# Patient Record
Sex: FEMALE | Race: OTHER | HISPANIC OR LATINO | Employment: FULL TIME | URBAN - METROPOLITAN AREA
[De-identification: names, ages, dates, MRNs, and addresses within clinical notes are randomized per-mention and may not be internally consistent; named-entity substitution may affect disease eponyms.]

---

## 2017-01-05 ENCOUNTER — ALLSCRIPTS OFFICE VISIT (OUTPATIENT)
Dept: OTHER | Facility: OTHER | Age: 47
End: 2017-01-05

## 2017-02-23 ENCOUNTER — ALLSCRIPTS OFFICE VISIT (OUTPATIENT)
Dept: OTHER | Facility: OTHER | Age: 47
End: 2017-02-23

## 2017-03-01 DIAGNOSIS — H81.10 BENIGN PAROXYSMAL VERTIGO: ICD-10-CM

## 2017-03-01 DIAGNOSIS — I10 ESSENTIAL (PRIMARY) HYPERTENSION: ICD-10-CM

## 2017-03-01 DIAGNOSIS — Z12.31 ENCOUNTER FOR SCREENING MAMMOGRAM FOR MALIGNANT NEOPLASM OF BREAST: ICD-10-CM

## 2017-03-01 DIAGNOSIS — D50.9 IRON DEFICIENCY ANEMIA: ICD-10-CM

## 2017-03-30 ENCOUNTER — ALLSCRIPTS OFFICE VISIT (OUTPATIENT)
Dept: OTHER | Facility: OTHER | Age: 47
End: 2017-03-30

## 2017-04-14 ENCOUNTER — HOSPITAL ENCOUNTER (OUTPATIENT)
Dept: RADIOLOGY | Age: 47
Discharge: HOME | End: 2017-04-14
Payer: COMMERCIAL

## 2017-04-14 DIAGNOSIS — Z12.31 ENCOUNTER FOR SCREENING MAMMOGRAM FOR MALIGNANT NEOPLASM OF BREAST: ICD-10-CM

## 2017-04-14 PROCEDURE — G0202 SCR MAMMO BI INCL CAD: HCPCS

## 2017-07-05 DIAGNOSIS — I10 ESSENTIAL (PRIMARY) HYPERTENSION: ICD-10-CM

## 2017-07-05 DIAGNOSIS — D50.9 IRON DEFICIENCY ANEMIA: ICD-10-CM

## 2017-07-20 ENCOUNTER — ALLSCRIPTS OFFICE VISIT (OUTPATIENT)
Dept: OTHER | Facility: OTHER | Age: 47
End: 2017-07-20

## 2017-09-25 DIAGNOSIS — D50.9 IRON DEFICIENCY ANEMIA: ICD-10-CM

## 2017-09-25 DIAGNOSIS — I10 ESSENTIAL (PRIMARY) HYPERTENSION: ICD-10-CM

## 2018-01-11 ENCOUNTER — ALLSCRIPTS OFFICE VISIT (OUTPATIENT)
Dept: OTHER | Facility: OTHER | Age: 48
End: 2018-01-11

## 2018-01-11 DIAGNOSIS — Z12.31 ENCOUNTER FOR SCREENING MAMMOGRAM FOR MALIGNANT NEOPLASM OF BREAST: ICD-10-CM

## 2018-01-13 VITALS
SYSTOLIC BLOOD PRESSURE: 124 MMHG | TEMPERATURE: 98.4 F | HEART RATE: 85 BPM | DIASTOLIC BLOOD PRESSURE: 84 MMHG | HEIGHT: 63 IN | BODY MASS INDEX: 33.04 KG/M2 | OXYGEN SATURATION: 99 % | WEIGHT: 186.5 LBS

## 2018-01-13 VITALS
HEIGHT: 63 IN | HEART RATE: 89 BPM | DIASTOLIC BLOOD PRESSURE: 86 MMHG | SYSTOLIC BLOOD PRESSURE: 142 MMHG | WEIGHT: 184.5 LBS | OXYGEN SATURATION: 99 % | TEMPERATURE: 99.1 F | BODY MASS INDEX: 32.69 KG/M2

## 2018-01-13 NOTE — PROGRESS NOTES
Assessment   1  Influenza vaccine needed (V04 81) (Z23)   2  Benign essential hypertension (401 1) (I10)   3  Lateral epicondylitis of right elbow (726 32) (M77 11)   4  Back pain, lumbosacral (724 2,724 6) (M54 5)   5  Leg paresthesia (782 0) (R20 2)   6  Iron deficiency anemia (280 9) (D50 9)    Plan   Back pain, lumbosacral, Lateral epicondylitis of right elbow    · Cyclobenzaprine HCl - 5 MG Oral Tablet; Take 1 tablet twice daily   · Nabumetone 500 MG Oral Tablet; Take 1 tablet twice daily  Benign essential hypertension, Iron deficiency anemia    · (1) COMPREHENSIVE METABOLIC PANEL; Status:Active; Requested for:02Apr2018;   Breast cancer screening    · * MAMMO SCREENING BILATERAL W CAD; Status:Hold For - Scheduling,Retrospective By Protocol    Authorization; Requested URD:[id number]; Influenza vaccine needed    · Stop: Fluzone Quadrivalent 0 5 ML Intramuscular Suspension Prefilled Syringe  Iron deficiency anemia    · (1) CBC/ PLT (NO DIFF); Status:Active; Requested for:02Apr2018; Discussion/Summary   Discussion Summary:    Patient will be provided with a forearm strap for lateral epicondylitis  We will prescribe Relafen 500 mg b i d  along with cyclobenzaprine 5 mg b i d  for her back  We discussed some back exercises as well as some exercises for her forearm muscles  Patient will contact the office if she is not improved within 3 to 4 weeks  Otherwise a follow-up is scheduled for 4 months      Chief Complaint   Chief Complaint Free Text Note Form: 6 months F/U for HTN, Iron deficiency, Sleep disorder, Obesity work 11/25/17    Chief Complaint Chronic Condition [facility]: Patient is here today for follow up of chronic conditions described in HPI  History of Present Illness   HPI: Patient presents to the office with complaints of right lateral elbow pain, bilateral lower back pain radiating into both buttocks and numbness in her right heel  Symptoms have been present now for approximately 3 weeks  Review of Systems   Complete-Female:      Constitutional: No fever, no chills, feels well, no tiredness, no recent weight gain or weight loss  Eyes: No complaints of eye pain, no red eyes, no eyesight problems, no discharge, no dry eyes, no itching of eyes  ENT: no complaints of earache, no loss of hearing, no nose bleeds, no nasal discharge, no sore throat, no hoarseness  Cardiovascular: No complaints of slow heart rate, no fast heart rate, no chest pain, no palpitations, no leg claudication, no lower extremity edema  Respiratory: No complaints of shortness of breath, no wheezing, no cough, no SOB on exertion, no orthopnea, no PND  Gastrointestinal: No complaints of abdominal pain, no constipation, no nausea or vomiting, no diarrhea, no bloody stools  Genitourinary: No complaints of dysuria, no incontinence, no pelvic pain, no dysmenorrhea, no vaginal discharge or bleeding  Musculoskeletal: as noted in HPI  Integumentary: No complaints of skin rash or lesions, no itching, no skin wounds, no breast pain or lump  Neurological: No complaints of headache, no confusion, no convulsions, no numbness, no dizziness or fainting, no tingling, no limb weakness, no difficulty walking  Psychiatric: Not suicidal, no sleep disturbance, no anxiety or depression, no change in personality, no emotional problems  Endocrine: No complaints of proptosis, no hot flashes, no muscle weakness, no deepening of the voice, no feelings of weakness  Hematologic/Lymphatic: No complaints of swollen glands, no swollen glands in the neck, does not bleed easily, does not bruise easily  Active Problems   1  Advance directive declined by patient (V49 89) (Z78 9)   2  Benign essential hypertension (401 1) (I10)   3  Bilateral carpal tunnel syndrome (354 0) (G56 03)   4   Class 1 obesity with alveolar hypoventilation without serious comorbidity with body mass index     (BMI) of 32 0 to 32 9 in adult (278 03,V85 32) (R61 3,W81 13)   5  Iron deficiency anemia (280 9) (D50 9)   6  Sleep disorder (780 50) (G47 9)    Past Medical History   1  History of Atypical chest pain (786 59) (R07 89)   2  History of BPPV (benign paroxysmal positional vertigo) (386 11) (H81 10)   3  History of abdominal pain (V13 89) (Z87 898)   4  History of acute pancreatitis (V12 79) (Z87 19)   5  History of anemia (V12 3) (Z86 2)   6  History of atopic dermatitis (V13 3) (Z87 2)   7  History of chest pain (V13 89) (Z87 898)   8  History of diarrhea (V12 79) (Z87 898)   9  History of gastroenteritis (V12 79) (Z87 19)   10  History of hypertension (V12 59) (Z86 79)   11  History of iron deficiency anemia (V12 3) (Z86 2)   12  History of nausea (V12 79) (Z87 898)   13  History of Localized hives (708 9) (L50 9)   14  History of Right shoulder pain, unspecified chronicity (719 41) (M25 511)  Active Problems And Past Medical History Reviewed: The active problems and past medical history were reviewed and updated today  Surgical History   1  History of Foot Surgery    Family History   Mother    1  Family history of diabetes mellitus (V18 0) (Z83 3)  Grandmother    2  Family history of malignant neoplasm (V16 9) (Z80 9)  Grandfather    3  Family history of diabetes mellitus (V18 0) (Z83 3)    Social History    · Advance directive declined by patient (V49 89) (Z78 9)   · Never a smoker   · No alcohol use   · No illicit drug use  Social History Reviewed: The social history was reviewed and is unchanged  Current Meds    1  Amlodipine Besylate-Valsartan 5-320 MG Oral Tablet; TAKE 1 TABLET DAILY; Therapy: 54PSP3284 to (Dragan Dinh)  Requested for: 91VOM9254; Last Rx:05Jan2017     Ordered   2  Omeprazole 20 MG Oral Capsule Delayed Release; TAKE 1 CAPSULE Daily PRN; Therapy: (Recorded:02Apr2015) to Recorded  Medication List Reviewed: The medication list was reviewed and updated today  Allergies   1  Amoxicillin CAPS   2  Penicillins   3  Medrol ([place]) TABS   4  Prevpac MISC    Vitals   Vital Signs    Recorded: 13VTK6955 03:56PM   Temperature 98 4 F, Oral   Heart Rate 72   Systolic 819, LUE, Sitting   Diastolic 86, LUE, Sitting   Height 5 ft 3 in   Weight 195 lb    BMI Calculated 34 54   BSA Calculated 1 91   O2 Saturation 98, RA     Physical Exam        Constitutional      General appearance: No acute distress, well appearing and well nourished  Eyes      Conjunctiva and lids: No swelling, erythema or discharge  Pupils and irises: Equal, round and reactive to light  Ears, Nose, Mouth, and Throat      External inspection of ears and nose: Normal        Pulmonary      Respiratory effort: No increased work of breathing or signs of respiratory distress  Auscultation of lungs: Clear to auscultation  Cardiovascular      Palpation of heart: Normal PMI, no thrills  Auscultation of heart: Normal rate and rhythm, normal S1 and S2, without murmurs  Examination of extremities for edema and/or varicosities: Normal        Abdomen      Abdomen: Non-tender, no masses  Lymphatic      Palpation of lymph nodes in neck: No lymphadenopathy  Musculoskeletal      Gait and station: Normal        Digits and nails: Normal without clubbing or cyanosis  -- Tenderness over the right lateral epicondyle  No palpable abnormalities in the right heel  Neurologic      Cranial nerves: Cranial nerves 2-12 intact  Reflexes: 2+ and symmetric  -- No pain on straight leg raising bilaterally to 45Â°  Sensation: No sensory loss  Psychiatric      Orientation to person, place, and time: Normal        Mood and affect: Normal           Health Management   History of Cervical cancer screening   (1) THIN PREP PAP WITH IMAGING; every 5 years; Next Due: 94CQU1013; Overdue  History of Depression screening   PHevery-2 Adult Depression Screening; every 1 year;  Last 16Bum6222; Next Due: 45NOI3201; Active  History of Visit for screening mammogram   Digital Bilateral Screening Mammogram With CAD; every 1 year; Last 06Apr2015; Next Due:    08TDW4874; Overdue  DIGTL SCRN MAMMO W/CAD; every 1 year; Last 06Apr2015; Next Due: 72IVJ3751;  Overdue    Signatures    Electronically signed by : Tam Cordero MD; Jan 11 2018  5:41PM EST                       (Author)

## 2018-01-14 VITALS
OXYGEN SATURATION: 98 % | DIASTOLIC BLOOD PRESSURE: 92 MMHG | SYSTOLIC BLOOD PRESSURE: 154 MMHG | TEMPERATURE: 98.5 F | BODY MASS INDEX: 32.96 KG/M2 | HEIGHT: 63 IN | HEART RATE: 78 BPM | WEIGHT: 186 LBS

## 2018-01-14 VITALS
HEART RATE: 85 BPM | HEIGHT: 63 IN | BODY MASS INDEX: 33.84 KG/M2 | TEMPERATURE: 99.2 F | WEIGHT: 191 LBS | SYSTOLIC BLOOD PRESSURE: 126 MMHG | OXYGEN SATURATION: 98 % | DIASTOLIC BLOOD PRESSURE: 86 MMHG

## 2018-01-23 VITALS
OXYGEN SATURATION: 98 % | TEMPERATURE: 98.4 F | HEIGHT: 63 IN | BODY MASS INDEX: 34.55 KG/M2 | SYSTOLIC BLOOD PRESSURE: 138 MMHG | HEART RATE: 72 BPM | WEIGHT: 195 LBS | DIASTOLIC BLOOD PRESSURE: 86 MMHG

## 2018-04-02 DIAGNOSIS — I10 ESSENTIAL (PRIMARY) HYPERTENSION: ICD-10-CM

## 2018-04-02 DIAGNOSIS — D50.9 IRON DEFICIENCY ANEMIA: ICD-10-CM

## 2018-04-20 ENCOUNTER — HOSPITAL ENCOUNTER (OUTPATIENT)
Dept: RADIOLOGY | Age: 48
Discharge: HOME | End: 2018-04-20
Payer: COMMERCIAL

## 2018-04-20 DIAGNOSIS — Z12.31 ENCOUNTER FOR SCREENING MAMMOGRAM FOR MALIGNANT NEOPLASM OF BREAST: ICD-10-CM

## 2018-04-20 PROCEDURE — 77067 SCR MAMMO BI INCL CAD: CPT

## 2018-04-20 PROCEDURE — 77063 BREAST TOMOSYNTHESIS BI: CPT

## 2018-04-25 RX ORDER — OMEPRAZOLE 20 MG/1
1 CAPSULE, DELAYED RELEASE ORAL DAILY PRN
COMMUNITY
End: 2018-04-26 | Stop reason: ALTCHOICE

## 2018-04-25 RX ORDER — AMLODIPINE AND VALSARTAN 5; 320 MG/1; MG/1
1 TABLET ORAL DAILY
COMMUNITY
Start: 2016-10-19 | End: 2019-04-18 | Stop reason: ALTCHOICE

## 2018-04-25 RX ORDER — NABUMETONE 500 MG/1
1 TABLET, FILM COATED ORAL 2 TIMES DAILY
COMMUNITY
Start: 2018-01-11 | End: 2019-10-24 | Stop reason: ALTCHOICE

## 2018-04-25 RX ORDER — MOMETASONE FUROATE 1 MG/G
OINTMENT TOPICAL DAILY
COMMUNITY
Start: 2015-08-06 | End: 2019-10-24 | Stop reason: SDUPTHER

## 2018-04-25 RX ORDER — CYCLOBENZAPRINE HCL 5 MG
1 TABLET ORAL 2 TIMES DAILY
COMMUNITY
Start: 2018-01-11 | End: 2019-10-24 | Stop reason: ALTCHOICE

## 2018-04-26 ENCOUNTER — OFFICE VISIT (OUTPATIENT)
Dept: INTERNAL MEDICINE CLINIC | Facility: CLINIC | Age: 48
End: 2018-04-26
Payer: COMMERCIAL

## 2018-04-26 VITALS
SYSTOLIC BLOOD PRESSURE: 138 MMHG | HEART RATE: 87 BPM | TEMPERATURE: 98.6 F | WEIGHT: 196.4 LBS | BODY MASS INDEX: 34.8 KG/M2 | DIASTOLIC BLOOD PRESSURE: 90 MMHG | HEIGHT: 63 IN | OXYGEN SATURATION: 98 %

## 2018-04-26 DIAGNOSIS — D50.9 IRON DEFICIENCY ANEMIA, UNSPECIFIED IRON DEFICIENCY ANEMIA TYPE: ICD-10-CM

## 2018-04-26 DIAGNOSIS — I10 BENIGN ESSENTIAL HYPERTENSION: Primary | ICD-10-CM

## 2018-04-26 DIAGNOSIS — E66.2 CLASS 1 OBESITY WITH ALVEOLAR HYPOVENTILATION WITHOUT SERIOUS COMORBIDITY WITH BODY MASS INDEX (BMI) OF 32.0 TO 32.9 IN ADULT (HCC): ICD-10-CM

## 2018-04-26 DIAGNOSIS — R79.89 ELEVATED LIVER FUNCTION TESTS: ICD-10-CM

## 2018-04-26 DIAGNOSIS — B35.1 ONYCHOMYCOSIS OF LEFT GREAT TOE: ICD-10-CM

## 2018-04-26 PROBLEM — G47.9 SLEEP DISORDER: Status: ACTIVE | Noted: 2017-07-20

## 2018-04-26 PROBLEM — M77.11 LATERAL EPICONDYLITIS OF RIGHT ELBOW: Status: ACTIVE | Noted: 2018-01-11

## 2018-04-26 PROBLEM — M54.50 BACK PAIN, LUMBOSACRAL: Status: ACTIVE | Noted: 2018-01-11

## 2018-04-26 PROBLEM — E66.811 CLASS 1 OBESITY WITH ALVEOLAR HYPOVENTILATION WITHOUT SERIOUS COMORBIDITY WITH BODY MASS INDEX (BMI) OF 32.0 TO 32.9 IN ADULT (HCC): Status: ACTIVE | Noted: 2017-07-20

## 2018-04-26 PROBLEM — R20.2 LEG PARESTHESIA: Status: ACTIVE | Noted: 2018-01-11

## 2018-04-26 PROCEDURE — 1036F TOBACCO NON-USER: CPT | Performed by: INTERNAL MEDICINE

## 2018-04-26 PROCEDURE — 99214 OFFICE O/P EST MOD 30 MIN: CPT | Performed by: INTERNAL MEDICINE

## 2018-04-26 RX ORDER — FERROUS SULFATE TAB EC 324 MG (65 MG FE EQUIVALENT) 324 (65 FE) MG
324 TABLET DELAYED RESPONSE ORAL
Qty: 50 TABLET | Refills: 0 | Status: SHIPPED | OUTPATIENT
Start: 2018-04-26 | End: 2021-09-21

## 2018-04-26 RX ORDER — KETOCONAZOLE 20 MG/G
CREAM TOPICAL DAILY
Qty: 30 G | Refills: 1 | Status: SHIPPED | OUTPATIENT
Start: 2018-04-26 | End: 2019-10-24 | Stop reason: ALTCHOICE

## 2018-04-26 RX ORDER — INDAPAMIDE 2.5 MG/1
2.5 TABLET, FILM COATED ORAL EVERY MORNING
Qty: 90 TABLET | Refills: 1 | Status: SHIPPED | OUTPATIENT
Start: 2018-04-26 | End: 2021-09-21 | Stop reason: SDUPTHER

## 2018-04-26 NOTE — ASSESSMENT & PLAN NOTE
Labs were indicative of a recurrent iron deficiency anemia  Patient does have a history of rather heavy menstrual periods  We will restart iron and re-evaluate

## 2018-04-26 NOTE — ASSESSMENT & PLAN NOTE
The patient has discontinued omeprazole on her own  No obvious etiology to the elevation liver function studies  We will recheck in 6 to 8 weeks and if liver enzymes remained elevated additional workup will be necessary

## 2018-04-26 NOTE — ASSESSMENT & PLAN NOTE
No major issues  Continued dietary management with calorie reduction is advised  Recent labs disclosed a mild elevation in transaminases  This could be representative of fatty liver  We will recheck in 6 to 8 weeks and if enzymes remain elevated we will initiate a diagnostic evaluation with ultrasound and serologies

## 2018-04-26 NOTE — PATIENT INSTRUCTIONS
We will restart the patient on iron supplements  We will recheck her labs in 6 weeks  Will follow up her liver enzymes  It if there is a persistence in her enzyme elevation we will consider diagnostic evaluation with ultrasound and serologies  She may also require evaluation and referral to Gastroenterology for endoscopic studies

## 2018-04-26 NOTE — PROGRESS NOTES
Assessment/Plan:    Benign essential hypertension  Blood pressure is mildly elevated from previous  On recheck it was 150/90  We will add a diuretic to her antihypertensive regimen and follow-up in 2 months    Onychomycosis of left great toe   We will prescribe ketoconazole cream twice daily for 30 to 60 days    Iron deficiency anemia   Labs were indicative of a recurrent iron deficiency anemia  Patient does have a history of rather heavy menstrual periods  We will restart iron and re-evaluate  Elevated liver function tests    The patient has discontinued omeprazole on her own  No obvious etiology to the elevation liver function studies  We will recheck in 6 to 8 weeks and if liver enzymes remained elevated additional workup will be necessary  Class 1 obesity with alveolar hypoventilation without serious comorbidity with body mass index (BMI) of 32 0 to 32 9 in [facility])   No major issues  Continued dietary management with calorie reduction is advised  Recent labs disclosed a mild elevation in transaminases  This could be representative of fatty liver  We will recheck in 6 to 8 weeks and if enzymes remain elevated we will initiate a diagnostic evaluation with ultrasound and serologies  Diagnoses and all orders for this visit:    Benign essential hypertension  -     indapamide (LOZOL) 2 5 mg tablet; Take 1 tablet (2 5 mg total) by mouth every morning for 180 days  -     CBC and differential; Future  -     Comprehensive metabolic panel; Future    Onychomycosis of left great toe  -     ketoconazole (NIZORAL) 2 % cream; Apply topically daily for 90 days  -     CBC and differential; Future  -     Comprehensive metabolic panel; Future    Iron deficiency anemia, unspecified iron deficiency anemia type  -     ferrous sulfate 324 (65 Fe) mg; Take 1 tablet (324 mg total) by mouth 2 (two) times a day before meals  -     Ferritin;  Future  -     CBC and differential; Future  -     Comprehensive metabolic panel; Future  -     Iron; Future  -     Iron Saturation %; Future    Class 1 obesity with alveolar hypoventilation without serious comorbidity with body mass index (BMI) of 32 0 to 32 9 in adult (HCC)  -     CBC and differential; Future  -     Comprehensive metabolic panel; Future    Elevated liver function tests  -     CBC and differential; Future  -     Comprehensive metabolic panel; Future    Other orders  -     amLODIPine-valsartan (EXFORGE) 5-320 MG per tablet; Take 1 tablet by mouth daily  -     cyclobenzaprine (FLEXERIL) 5 mg tablet; Take 1 tablet by mouth 2 (two) times a day  -     mometasone (ELOCON) 0 1 % ointment; Apply topically daily  -     nabumetone (RELAFEN) 500 mg tablet; Take 1 tablet by mouth 2 (two) times a day  -     Discontinue: omeprazole (PriLOSEC) 20 mg delayed release capsule; Take 1 capsule by mouth daily as needed          Subjective:      Patient ID: Emily Cui is a 52 y o  female  Patient presents to the office for follow-up visit for hypertension, history of iron deficiency anemia and complains only of a fungal infection of her left great toenail  She otherwise she offers no particular complaints  She had some recent lab work which disclosed a drop in her hemoglobin and a recurrence of some microcytosis probably consistent with a recurrent iron deficiency  She has never had a colonoscopy  She denies any changes in bowel habits she denies any melena or hematochezia or change in her bowel habits  There is no family history of malignancy in any 1st degree relatives  She denies any abdominal complaints  She has no nausea or vomiting  She denies any  abdominal  Discomfort          Family History   Problem Relation Age of Onset    Diabetes Mother      Diabetes Mellitus    Diabetes Other      Diabetes Mellitus    Cancer Other     No Known Problems Father      Social History     Social History    Marital status: /Civil Union     Spouse name: N/A    Number of children: N/A    Years of education: N/A     Occupational History    Not on file  Social History Main Topics    Smoking status: Never Smoker    Smokeless tobacco: Never Used    Alcohol use No    Drug use: No    Sexual activity: Not on file     Other Topics Concern    Not on file     Social History Narrative    Advance directive declined by patient     Past Medical History:   Diagnosis Date    Anemia     Atopic dermatitis 08/06/2015    Atypical chest pain 04/02/2015    BPPV (benign paroxysmal positional vertigo) 01/05/2017    Chest pain     Class 1 obesity with alveolar hypoventilation without serious comorbidity with body mass index (BMI) of 32 0 to 32 9 [facility] 7/20/2017    Gastroenteritis     Hypertension     Iron deficiency anemia 03/30/2017    Lateral epicondylitis of right elbow 1/11/2018       Current Outpatient Prescriptions:     amLODIPine-valsartan (EXFORGE) 5-320 MG per tablet, Take 1 tablet by mouth daily, Disp: , Rfl:     cyclobenzaprine (FLEXERIL) 5 mg tablet, Take 1 tablet by mouth 2 (two) times a day, Disp: , Rfl:     mometasone (ELOCON) 0 1 % ointment, Apply topically daily, Disp: , Rfl:     nabumetone (RELAFEN) 500 mg tablet, Take 1 tablet by mouth 2 (two) times a day, Disp: , Rfl:     ferrous sulfate 324 (65 Fe) mg, Take 1 tablet (324 mg total) by mouth 2 (two) times a day before meals, Disp: 50 tablet, Rfl: 0    indapamide (LOZOL) 2 5 mg tablet, Take 1 tablet (2 5 mg total) by mouth every morning for 180 days, Disp: 90 tablet, Rfl: 1    ketoconazole (NIZORAL) 2 % cream, Apply topically daily for 90 days, Disp: 30 g, Rfl: 1  Allergies   Allergen Reactions    Medrol  [Methylprednisolone]     Prevpac  [[id number]]     Amoxicillin Rash    Penicillins Rash     Past Surgical History:   Procedure Laterality Date    FOOT SURGERY           Review of Systems   Constitutional: Negative  HENT: Negative  Eyes: Negative  Respiratory: Negative  Cardiovascular: Negative  Gastrointestinal: Negative  Endocrine: Negative  Genitourinary: Negative  Musculoskeletal: Negative  Allergic/Immunologic: Negative  Neurological: Negative  Hematological: Negative  Psychiatric/Behavioral: Negative  Objective:      /90 (BP Location: Left arm, Patient Position: Sitting, Cuff Size: Standard)   Pulse 87   Temp 98 6 °F (37 °C) (Oral)   Ht 5' 3" (1 6 m)   Wt 89 1 kg (196 lb 6 4 oz)   SpO2 98%   BMI 34 79 kg/m²          Physical Exam   Constitutional: She is oriented to person, place, and time  She appears well-developed and well-nourished  HENT:   Head: Normocephalic and atraumatic  Eyes: Conjunctivae are normal  Pupils are equal, round, and reactive to light  No scleral icterus  Neck: Normal range of motion  No JVD present  No thyromegaly present  Cardiovascular: Normal rate, regular rhythm, normal heart sounds and intact distal pulses  No murmur heard  Pulmonary/Chest: Effort normal and breath sounds normal  No respiratory distress  Abdominal: Soft  She exhibits no distension and no mass  There is no tenderness  Musculoskeletal: Normal range of motion  She exhibits no edema or deformity  Lymphadenopathy:     She has no cervical adenopathy  Neurological: She is alert and oriented to person, place, and time  Skin:   Discoloration of the left great toenail consistent with onychomycosis   Psychiatric: She has a normal mood and affect   Thought content normal

## 2018-04-26 NOTE — ASSESSMENT & PLAN NOTE
Blood pressure is mildly elevated from previous  On recheck it was 150/90    We will add a diuretic to her antihypertensive regimen and follow-up in 2 months

## 2019-04-18 ENCOUNTER — OFFICE VISIT (OUTPATIENT)
Dept: INTERNAL MEDICINE CLINIC | Facility: CLINIC | Age: 49
End: 2019-04-18
Payer: COMMERCIAL

## 2019-04-18 VITALS
TEMPERATURE: 98.5 F | WEIGHT: 191.8 LBS | DIASTOLIC BLOOD PRESSURE: 70 MMHG | OXYGEN SATURATION: 98 % | HEART RATE: 86 BPM | SYSTOLIC BLOOD PRESSURE: 140 MMHG | HEIGHT: 63 IN | BODY MASS INDEX: 33.98 KG/M2

## 2019-04-18 DIAGNOSIS — Z12.31 ENCOUNTER FOR SCREENING MAMMOGRAM FOR MALIGNANT NEOPLASM OF BREAST: ICD-10-CM

## 2019-04-18 DIAGNOSIS — Z23 NEED FOR DIPHTHERIA-TETANUS-PERTUSSIS (TDAP) VACCINE: ICD-10-CM

## 2019-04-18 DIAGNOSIS — H53.9 VISION DISTURBANCE: ICD-10-CM

## 2019-04-18 DIAGNOSIS — Z23 NEED FOR PNEUMOCOCCAL VACCINE: ICD-10-CM

## 2019-04-18 DIAGNOSIS — I10 BENIGN ESSENTIAL HYPERTENSION: ICD-10-CM

## 2019-04-18 DIAGNOSIS — R79.89 ELEVATED LIVER FUNCTION TESTS: ICD-10-CM

## 2019-04-18 DIAGNOSIS — Z23 NEED FOR INFLUENZA VACCINATION: Primary | ICD-10-CM

## 2019-04-18 DIAGNOSIS — E66.2 CLASS 1 OBESITY WITH ALVEOLAR HYPOVENTILATION WITHOUT SERIOUS COMORBIDITY WITH BODY MASS INDEX (BMI) OF 32.0 TO 32.9 IN ADULT (HCC): ICD-10-CM

## 2019-04-18 DIAGNOSIS — D50.9 IRON DEFICIENCY ANEMIA, UNSPECIFIED IRON DEFICIENCY ANEMIA TYPE: ICD-10-CM

## 2019-04-18 PROCEDURE — 3008F BODY MASS INDEX DOCD: CPT | Performed by: INTERNAL MEDICINE

## 2019-04-18 PROCEDURE — 99214 OFFICE O/P EST MOD 30 MIN: CPT | Performed by: INTERNAL MEDICINE

## 2019-04-18 RX ORDER — VALSARTAN 320 MG/1
320 TABLET ORAL DAILY
Qty: 30 TABLET | Refills: 5 | Status: SHIPPED | OUTPATIENT
Start: 2019-04-27 | End: 2019-10-24 | Stop reason: SDUPTHER

## 2019-04-19 ENCOUNTER — HOSPITAL ENCOUNTER (OUTPATIENT)
Dept: RADIOLOGY | Age: 49
Discharge: HOME | End: 2019-04-19
Payer: COMMERCIAL

## 2019-04-19 VITALS — HEIGHT: 63 IN | BODY MASS INDEX: 33.84 KG/M2 | WEIGHT: 191 LBS

## 2019-04-19 DIAGNOSIS — Z12.31 ENCOUNTER FOR SCREENING MAMMOGRAM FOR MALIGNANT NEOPLASM OF BREAST: ICD-10-CM

## 2019-04-19 PROCEDURE — 77067 SCR MAMMO BI INCL CAD: CPT

## 2019-04-19 PROCEDURE — 77063 BREAST TOMOSYNTHESIS BI: CPT

## 2019-10-24 ENCOUNTER — OFFICE VISIT (OUTPATIENT)
Dept: INTERNAL MEDICINE CLINIC | Facility: CLINIC | Age: 49
End: 2019-10-24
Payer: COMMERCIAL

## 2019-10-24 VITALS
DIASTOLIC BLOOD PRESSURE: 80 MMHG | BODY MASS INDEX: 34.34 KG/M2 | TEMPERATURE: 98.3 F | SYSTOLIC BLOOD PRESSURE: 160 MMHG | HEIGHT: 62 IN | HEART RATE: 69 BPM | WEIGHT: 186.6 LBS | OXYGEN SATURATION: 98 %

## 2019-10-24 DIAGNOSIS — I10 BENIGN ESSENTIAL HYPERTENSION: ICD-10-CM

## 2019-10-24 DIAGNOSIS — Z23 NEED FOR INFLUENZA VACCINATION: Primary | ICD-10-CM

## 2019-10-24 DIAGNOSIS — L20.81 ATOPIC NEURODERMATITIS: ICD-10-CM

## 2019-10-24 DIAGNOSIS — H60.312 ACUTE DIFFUSE OTITIS EXTERNA OF LEFT EAR: ICD-10-CM

## 2019-10-24 DIAGNOSIS — D50.9 IRON DEFICIENCY ANEMIA, UNSPECIFIED IRON DEFICIENCY ANEMIA TYPE: ICD-10-CM

## 2019-10-24 PROCEDURE — 3008F BODY MASS INDEX DOCD: CPT | Performed by: INTERNAL MEDICINE

## 2019-10-24 PROCEDURE — 99214 OFFICE O/P EST MOD 30 MIN: CPT | Performed by: INTERNAL MEDICINE

## 2019-10-24 RX ORDER — MOMETASONE FUROATE 1 MG/G
OINTMENT TOPICAL DAILY
Qty: 45 G | Refills: 1 | Status: SHIPPED | OUTPATIENT
Start: 2019-10-24

## 2019-10-24 RX ORDER — SULFAMETHOXAZOLE AND TRIMETHOPRIM 800; 160 MG/1; MG/1
1 TABLET ORAL EVERY 12 HOURS SCHEDULED
Qty: 14 TABLET | Refills: 0 | Status: SHIPPED | OUTPATIENT
Start: 2019-10-24 | End: 2019-10-31

## 2019-10-24 RX ORDER — CIPROFLOXACIN 0.5 MG/.25ML
0.2 SOLUTION/ DROPS AURICULAR (OTIC) 2 TIMES DAILY
Qty: 1 VIAL | Refills: 0 | Status: SHIPPED | OUTPATIENT
Start: 2019-10-24 | End: 2019-10-31

## 2019-10-24 RX ORDER — VALSARTAN 320 MG/1
320 TABLET ORAL DAILY
Qty: 30 TABLET | Refills: 5 | Status: SHIPPED | OUTPATIENT
Start: 2019-10-24 | End: 2020-06-12 | Stop reason: ALTCHOICE

## 2019-10-24 NOTE — ASSESSMENT & PLAN NOTE
Will prescribe ciprofloxacin otic solution twice daily along with Bactrim double strength 1 tablet twice daily

## 2019-10-24 NOTE — PROGRESS NOTES
Assessment/Plan:    Atopic neurodermatitis  Will prescribe Elocon 0 1% to be applied twice daily  Acute diffuse otitis externa of left ear  Will prescribe ciprofloxacin otic solution twice daily along with Bactrim double strength 1 tablet twice daily  Benign essential hypertension  Restart valsartan 320 mg once daily    Iron deficiency anemia  Follow-up CBC with differential requested       Diagnoses and all orders for this visit:    Need for influenza vaccination  -     influenza vaccine, [id number], quadrivalent, 0 5 mL, preservative-free, for adult and pediatric patients 6 mos+ (AFLURIA, FLUARIX, FLULAVAL, FLUZONE)    Benign essential hypertension  -     valsartan (DIOVAN) 320 MG tablet; Take 1 tablet (320 mg total) by mouth daily    Acute diffuse otitis externa of left ear  -     Ciprofloxacin HCl 0 2 % otic solution; Administer 0 2 mL into the left ear 2 (two) times a day for 7 days  -     sulfamethoxazole-trimethoprim (BACTRIM DS) 800-160 mg per tablet; Take 1 tablet by mouth every 12 (twelve) hours for 7 days    Atopic neurodermatitis  -     mometasone (ELOCON) 0 1 % ointment; Apply topically daily    Iron deficiency anemia, unspecified iron deficiency anemia type  -     CBC and Platelet; Future  -     Basic metabolic panel; Future          Subjective:      Patient ID: Cheyenne Panchal is a 52 y o  female  Patient presents to the office for follow-up visit and is complaining of some pain in her left ear  She denies any fevers or chills  She denies sore throat  She is due for some follow-up lab work  She discontinued taking her valsartan when she saw there was a recall but did not contact the office for alternative medication  Consequently, her blood pressure is noted to be mildly elevated  She also complains of a rash on the extensor surfaces of both elbows        Family History   Problem Relation Age of Onset    Diabetes Mother         Diabetes Mellitus    Diabetes Other         Diabetes Mellitus    Cancer Other     No Known Problems Father      Social History     Socioeconomic History    Marital status: /Civil Union     Spouse name: Not on file    Number of children: Not on file    Years of education: Not on file    Highest education level: Not on file   Occupational History    Not on file   Social Needs    Financial resource strain: Not on file    Food insecurity:     Worry: Not on file     Inability: Not on file    Transportation needs:     Medical: Not on file     Non-medical: Not on file   Tobacco Use    Smoking status: Never Smoker    Smokeless tobacco: Never Used   Substance and Sexual Activity    Alcohol use: No    Drug use: No    Sexual activity: Not on file   Lifestyle    Physical activity:     Days per week: Not on file     Minutes per session: Not on file    Stress: Not on file   Relationships    Social connections:     Talks on phone: Not on file     Gets together: Not on file     Attends Mandaen service: Not on file     Active member of club or organization: Not on file     Attends meetings of clubs or organizations: Not on file     Relationship status: Not on file    Intimate partner violence:     Fear of current or ex partner: Not on file     Emotionally abused: Not on file     Physically abused: Not on file     Forced sexual activity: Not on file   Other Topics Concern    Not on file   Social History Narrative    Advance directive declined by patient     Past Medical History:   Diagnosis Date    Anemia     Atopic dermatitis 08/06/2015    Atypical chest pain 04/02/2015    BPPV (benign paroxysmal positional vertigo) 01/05/2017    Chest pain     Class 1 obesity with alveolar hypoventilation without serious comorbidity with body mass index (BMI) of 32 0 to 32 9 in adult ([facility] ) 7/20/2017    Gastroenteritis     Hypertension     Iron deficiency anemia 03/30/2017    Lateral epicondylitis of right elbow 1/11/2018       Current Outpatient Medications:    ferrous sulfate 324 (65 Fe) mg, Take 1 tablet (324 mg total) by mouth 2 (two) times a day before meals, Disp: 50 tablet, Rfl: 0    mometasone (ELOCON) 0 1 % ointment, Apply topically daily, Disp: 45 g, Rfl: 1    Ciprofloxacin HCl 0 2 % otic solution, Administer 0 2 mL into the left ear 2 (two) times a day for 7 days, Disp: 1 vial, Rfl: 0    indapamide (LOZOL) 2 5 mg tablet, Take 1 tablet (2 5 mg total) by mouth every morning for 180 days (Patient not taking: Reported on 10/24/2019), Disp: 90 tablet, Rfl: 1    sulfamethoxazole-trimethoprim (BACTRIM DS) 800-160 mg per tablet, Take 1 tablet by mouth every 12 (twelve) hours for 7 days, Disp: 14 tablet, Rfl: 0    valsartan (DIOVAN) 320 MG tablet, Take 1 tablet (320 mg total) by mouth daily, Disp: 30 tablet, Rfl: 5  Allergies   Allergen Reactions    Medrol  [Methylprednisolone]     Prevpac  [[id number]]     Amoxicillin Rash    Penicillins Rash     Past Surgical History:   Procedure Laterality Date    FOOT SURGERY           Review of Systems   Constitutional: Negative  HENT: Positive for ear pain (And mild swelling)  Eyes: Negative  Respiratory: Negative  Cardiovascular: Negative  Gastrointestinal: Negative  Genitourinary: Negative  Musculoskeletal: Negative  Skin: Negative  Neurological: Negative  Psychiatric/Behavioral: Negative  All other systems reviewed and are negative  Objective:      BP (!) 174/90 (BP Location: Left arm, Patient Position: Sitting, Cuff Size: Standard)   Pulse 69   Temp 98 3 °F (36 8 °C) (Oral)   Ht 5' 2 21" (1 58 m)   Wt 84 6 kg (186 lb 9 6 oz)   SpO2 98%   BMI 33 91 kg/m²          Physical Exam   Constitutional: She is oriented to person, place, and time  She appears well-developed and well-nourished  No distress  HENT:   Head: Normocephalic and atraumatic     Right Ear: Tympanic membrane, external ear and ear canal normal    Left Ear: Tympanic membrane and ear canal normal  There is swelling (Mild swelling of the left auricle) and tenderness (Mild tenderness of the externally)  No mastoid tenderness  Tympanic membrane is not injected, not perforated, not retracted and not bulging  No hemotympanum  Mouth/Throat: Oropharynx is clear and moist    Eyes: Pupils are equal, round, and reactive to light  Conjunctivae are normal  No scleral icterus  Neck: Neck supple  No JVD present  No thyromegaly present  Cardiovascular: Normal rate and regular rhythm  Pulmonary/Chest: Effort normal  No respiratory distress  Abdominal: Soft  Bowel sounds are normal  She exhibits no distension  Musculoskeletal: She exhibits no edema or deformity  Lymphadenopathy:     She has no cervical adenopathy  Neurological: She is alert and oriented to person, place, and time  No cranial nerve deficit  No focal motor deficits  Skin: Skin is warm and dry  Rash (Atopic appearing rash 3 cm distal to the olecranon bilaterally measuring approximately 3 cm diameter) noted  She is not diaphoretic  No erythema  Psychiatric: She has a normal mood and affect  Her behavior is normal    Vitals reviewed

## 2020-06-12 ENCOUNTER — OFFICE VISIT (OUTPATIENT)
Dept: INTERNAL MEDICINE CLINIC | Facility: CLINIC | Age: 50
End: 2020-06-12
Payer: COMMERCIAL

## 2020-06-12 VITALS
HEIGHT: 63 IN | HEART RATE: 72 BPM | TEMPERATURE: 98.1 F | SYSTOLIC BLOOD PRESSURE: 164 MMHG | WEIGHT: 185.8 LBS | DIASTOLIC BLOOD PRESSURE: 80 MMHG | OXYGEN SATURATION: 98 % | BODY MASS INDEX: 32.92 KG/M2

## 2020-06-12 DIAGNOSIS — Z12.31 ENCOUNTER FOR SCREENING MAMMOGRAM FOR MALIGNANT NEOPLASM OF BREAST: Primary | ICD-10-CM

## 2020-06-12 DIAGNOSIS — I10 BENIGN ESSENTIAL HYPERTENSION: ICD-10-CM

## 2020-06-12 DIAGNOSIS — D50.9 IRON DEFICIENCY ANEMIA, UNSPECIFIED IRON DEFICIENCY ANEMIA TYPE: ICD-10-CM

## 2020-06-12 DIAGNOSIS — M70.50 PES ANSERINE BURSITIS: ICD-10-CM

## 2020-06-12 DIAGNOSIS — R79.89 ELEVATED LIVER FUNCTION TESTS: ICD-10-CM

## 2020-06-12 PROCEDURE — 3008F BODY MASS INDEX DOCD: CPT | Performed by: INTERNAL MEDICINE

## 2020-06-12 PROCEDURE — 3077F SYST BP >= 140 MM HG: CPT | Performed by: INTERNAL MEDICINE

## 2020-06-12 PROCEDURE — 3079F DIAST BP 80-89 MM HG: CPT | Performed by: INTERNAL MEDICINE

## 2020-06-12 PROCEDURE — 1036F TOBACCO NON-USER: CPT | Performed by: INTERNAL MEDICINE

## 2020-06-12 PROCEDURE — 99214 OFFICE O/P EST MOD 30 MIN: CPT | Performed by: INTERNAL MEDICINE

## 2020-06-12 RX ORDER — AMLODIPINE AND VALSARTAN 5; 320 MG/1; MG/1
1 TABLET ORAL DAILY
Qty: 30 TABLET | Refills: 5 | Status: SHIPPED | OUTPATIENT
Start: 2020-06-12 | End: 2021-05-19

## 2020-07-13 ENCOUNTER — TELEPHONE (OUTPATIENT)
Dept: INTERNAL MEDICINE CLINIC | Facility: CLINIC | Age: 50
End: 2020-07-13

## 2020-07-13 DIAGNOSIS — Z12.11 COLON CANCER SCREENING: Primary | ICD-10-CM

## 2020-07-13 NOTE — TELEPHONE ENCOUNTER
cologuard ordered  Please let patient know  Also, please tell patient when she receives the kit to call the number and verify test is 100% covered  Then please return kit within 1 week of receiving kit  Thanks so much

## 2020-07-13 NOTE — TELEPHONE ENCOUNTER
Left message on machine for patient to call me at [facility] office  Pt speaks [place]  Pt due for CRC screen  Please find out if patient is willing to do cologuard or colonoscopy and let me know

## 2020-10-09 ENCOUNTER — HOSPITAL ENCOUNTER (OUTPATIENT)
Dept: RADIOLOGY | Age: 50
Discharge: HOME | End: 2020-10-09
Payer: COMMERCIAL

## 2020-10-09 VITALS — BODY MASS INDEX: 33.66 KG/M2 | WEIGHT: 190 LBS | HEIGHT: 63 IN

## 2020-10-09 DIAGNOSIS — Z12.31 ENCOUNTER FOR SCREENING MAMMOGRAM FOR MALIGNANT NEOPLASM OF BREAST: ICD-10-CM

## 2020-10-09 PROCEDURE — 77067 SCR MAMMO BI INCL CAD: CPT

## 2020-10-09 PROCEDURE — 77063 BREAST TOMOSYNTHESIS BI: CPT

## 2020-11-12 NOTE — TELEPHONE ENCOUNTER
lmom letting the pt know that she would need to send in the cologuard kit with in the next week and if she has any question please call us back

## 2020-11-12 NOTE — TELEPHONE ENCOUNTER
Pt speaks [place]  We will do not have Cologuard results  Please call pt and have her return kit within a week per Dr Luther Trimble  Pt has pending appt with Dr Luther Trimble on 12/17/2020  Results can be reviewed at appt  Thank you

## 2020-12-03 ENCOUNTER — VBI (OUTPATIENT)
Dept: ADMINISTRATIVE | Facility: OTHER | Age: 50
End: 2020-12-03

## 2021-02-22 NOTE — TELEPHONE ENCOUNTER
Can someone please call pt and inform her Dr Marcella Astudillo would like her to return the Cologuard kit within next week? Thank you

## 2021-04-06 ENCOUNTER — VBI (OUTPATIENT)
Dept: ADMINISTRATIVE | Facility: OTHER | Age: 51
End: 2021-04-06

## 2021-04-13 NOTE — TELEPHONE ENCOUNTER
Left message on machine for patient to call me at [facility]  Pt can return cologuard kit to [facility]  Dr Sagrario Shelby would like kit returned within 1 week

## 2021-05-19 DIAGNOSIS — I10 BENIGN ESSENTIAL HYPERTENSION: ICD-10-CM

## 2021-05-19 RX ORDER — AMLODIPINE AND VALSARTAN 5; 320 MG/1; MG/1
TABLET ORAL
Qty: 30 TABLET | Refills: 5 | Status: SHIPPED | OUTPATIENT
Start: 2021-05-19 | End: 2022-08-02 | Stop reason: SDUPTHER

## 2021-07-09 ENCOUNTER — VBI (OUTPATIENT)
Dept: ADMINISTRATIVE | Facility: OTHER | Age: 51
End: 2021-07-09

## 2021-08-02 ENCOUNTER — VBI (OUTPATIENT)
Dept: ADMINISTRATIVE | Facility: OTHER | Age: 51
End: 2021-08-02

## 2021-09-21 ENCOUNTER — OFFICE VISIT (OUTPATIENT)
Dept: INTERNAL MEDICINE CLINIC | Facility: CLINIC | Age: 51
End: 2021-09-21
Payer: COMMERCIAL

## 2021-09-21 VITALS
OXYGEN SATURATION: 98 % | BODY MASS INDEX: 33.49 KG/M2 | HEART RATE: 80 BPM | HEIGHT: 62 IN | TEMPERATURE: 98.8 F | DIASTOLIC BLOOD PRESSURE: 80 MMHG | SYSTOLIC BLOOD PRESSURE: 140 MMHG | WEIGHT: 182 LBS

## 2021-09-21 DIAGNOSIS — D50.9 IRON DEFICIENCY ANEMIA, UNSPECIFIED IRON DEFICIENCY ANEMIA TYPE: ICD-10-CM

## 2021-09-21 DIAGNOSIS — G44.209 TENSION HEADACHE: ICD-10-CM

## 2021-09-21 DIAGNOSIS — I10 BENIGN ESSENTIAL HYPERTENSION: ICD-10-CM

## 2021-09-21 DIAGNOSIS — Z11.59 NEED FOR HEPATITIS C SCREENING TEST: ICD-10-CM

## 2021-09-21 DIAGNOSIS — R53.83 FATIGUE, UNSPECIFIED TYPE: ICD-10-CM

## 2021-09-21 DIAGNOSIS — M72.2 PLANTAR FASCIITIS OF LEFT FOOT: ICD-10-CM

## 2021-09-21 DIAGNOSIS — Z12.11 ENCOUNTER FOR SCREENING FOR MALIGNANT NEOPLASM OF COLON: ICD-10-CM

## 2021-09-21 DIAGNOSIS — Z12.31 ENCOUNTER FOR SCREENING MAMMOGRAM FOR MALIGNANT NEOPLASM OF BREAST: Primary | ICD-10-CM

## 2021-09-21 PROBLEM — R59.0 LYMPHADENOPATHY OF RIGHT CERVICAL REGION: Status: ACTIVE | Noted: 2021-09-21

## 2021-09-21 PROCEDURE — 1036F TOBACCO NON-USER: CPT | Performed by: INTERNAL MEDICINE

## 2021-09-21 PROCEDURE — 3725F SCREEN DEPRESSION PERFORMED: CPT | Performed by: INTERNAL MEDICINE

## 2021-09-21 PROCEDURE — 99214 OFFICE O/P EST MOD 30 MIN: CPT | Performed by: INTERNAL MEDICINE

## 2021-09-21 PROCEDURE — 3008F BODY MASS INDEX DOCD: CPT | Performed by: INTERNAL MEDICINE

## 2021-09-21 RX ORDER — FERROUS SULFATE TAB EC 324 MG (65 MG FE EQUIVALENT) 324 (65 FE) MG
324 TABLET DELAYED RESPONSE ORAL DAILY
Start: 2021-09-21 | End: 2022-08-02 | Stop reason: ALTCHOICE

## 2021-09-21 RX ORDER — INDAPAMIDE 2.5 MG/1
2.5 TABLET, FILM COATED ORAL EVERY MORNING
Qty: 90 TABLET | Refills: 1 | Status: SHIPPED | OUTPATIENT
Start: 2021-09-21 | End: 2022-08-02 | Stop reason: ALTCHOICE

## 2021-09-21 RX ORDER — AMITRIPTYLINE HYDROCHLORIDE 25 MG/1
25 TABLET, FILM COATED ORAL
Qty: 30 TABLET | Refills: 5 | Status: SHIPPED | OUTPATIENT
Start: 2021-09-21 | End: 2022-08-02 | Stop reason: SINTOL

## 2021-09-22 NOTE — PROGRESS NOTES
Assessment/Plan:    Benign essential hypertension    Will restart Lozol 2 5 mg in conjunction with amlodipine valsartan    Iron deficiency anemia   History of iron deficiency anemia  Will recheck labs including ferritin level    Plantar fasciitis of left foot    Recommended stretching exercises, ice applications at the end of the workday    Tension headache   Will initiate a trial of amitriptyline at bedtime       Diagnoses and all orders for this visit:    Encounter for screening mammogram for malignant neoplasm of breast    Encounter for screening for malignant neoplasm of colon  -     Cologuard    Need for hepatitis C screening test  -     Hepatitis C antibody; Future    Iron deficiency anemia, unspecified iron deficiency anemia type  -     CBC and Platelet; Future  -     T3; Future  -     T4, free; Future  -     TSH, 3rd generation; Future  -     Ferritin; Future  -     ferrous sulfate 324 (65 Fe) mg; Take 1 tablet (324 mg total) by mouth daily    Benign essential hypertension  -     CBC and Platelet; Future  -     Comprehensive metabolic panel; Future  -     C-reactive protein; Future  -     Lipid panel; Future  -     indapamide (LOZOL) 2 5 mg tablet; Take 1 tablet (2 5 mg total) by mouth every morning    Fatigue, unspecified type  -     T3; Future  -     T4, free; Future  -     TSH, 3rd generation; Future    Plantar fasciitis of left foot  -     CBC and Platelet; Future  -     C-reactive protein; Future  -     Ferritin; Future    Tension headache    Other orders  -     Cancel: Mammo screening bilateral w 3d & cad; Future          Subjective:      Patient ID: Annelise Nuno is a 46 y o  female  Patient presents to the office for follow-up visit  She has a history of iron deficiency anemia, hypertension and her blood pressure remains high normal   She was previously initiated on amlodipine and valsartan at the time her diuretics were discontinued    She also complains of tension-type headaches that are described as bifrontal and also with some soreness in the back of her neck  She also notes some intermittent mild lymphadenopathy on the right side of her neck  No associated fevers  No associated ear pain although she does have a history of ex otitis externa of the right ear  She also complains of a recurring pain in her left foot  She had had previous surgery for plantar fasciitis and states this pain feels very similar to what she had experienced previously  Fatigue  Associated symptoms include fatigue and headaches         Family History   Problem Relation Age of Onset    Diabetes Mother         Diabetes Mellitus    Diabetes Other         Diabetes Mellitus    No Known Problems Father     No Known Problems Sister     No Known Problems Daughter     No Known Problems Maternal Grandmother     No Known Problems Maternal Grandfather     No Known Problems Paternal Grandmother     No Known Problems Paternal Grandfather     No Known Problems Sister     No Known Problems Maternal Aunt     No Known Problems Maternal Aunt     No Known Problems Maternal Aunt     No Known Problems Maternal Aunt     No Known Problems Maternal Aunt     No Known Problems Maternal Aunt     No Known Problems Maternal Aunt     No Known Problems Maternal Aunt     No Known Problems Maternal Aunt      Social History     Socioeconomic History    Marital status: /Civil Union     Spouse name: Not on file    Number of children: Not on file    Years of education: Not on file    Highest education level: Not on file   Occupational History    Not on file   Tobacco Use    Smoking status: Never Smoker    Smokeless tobacco: Never Used   Vaping Use    Vaping Use: Never used   Substance and Sexual Activity    Alcohol use: No    Drug use: No    Sexual activity: Not on file   Other Topics Concern    Not on file   Social History Narrative    Advance directive declined by patient     Social Determinants of Health Financial Resource Strain:     Difficulty of Paying Living Expenses:    Food Insecurity:     Worried About Running Out of Food in the Last Year:     [address] in the Last Year:    Transportation Needs:     Lack of Transportation (Medical):      Lack of Transportation (Non-Medical):    Physical Activity:     Days of Exercise per Week:     Minutes of Exercise per Session:    Stress:     Feeling of Stress :    Social Connections:     Frequency of Communication with Friends and Family:     Frequency of Social Gatherings with Friends and Family:     Attends [employer] Services:     Active Member of Clubs or Organizations:     Attends Club or Organization Meetings:     Marital Status:    Intimate Partner Violence:     Fear of Current or Ex-Partner:     Emotionally Abused:     Physically Abused:     Sexually Abused:      Past Medical History:   Diagnosis Date    Anemia     Atopic dermatitis 08/06/2015    Atypical chest pain 04/02/2015    BPPV (benign paroxysmal positional vertigo) 01/05/2017    Chest pain     Class 1 obesity with alveolar hypoventilation without serious comorbidity with body mass index (BMI) of 32 0 to 32 9 in adult ([facility] ) 7/20/2017    Gastroenteritis     Hypertension     Iron deficiency anemia 03/30/2017    Lateral epicondylitis of right elbow 1/11/2018       Current Outpatient Medications:     amLODIPine-valsartan (EXFORGE) 5-320 MG per tablet, TAKE 1 TABLET BY MOUTH EVERY DAY, Disp: 30 tablet, Rfl: 5    ferrous sulfate 324 (65 Fe) mg, Take 1 tablet (324 mg total) by mouth daily, Disp: , Rfl:     mometasone (ELOCON) 0 1 % ointment, Apply topically daily, Disp: 45 g, Rfl: 1    indapamide (LOZOL) 2 5 mg tablet, Take 1 tablet (2 5 mg total) by mouth every morning, Disp: 90 tablet, Rfl: 1  Allergies   Allergen Reactions    Medrol [Methylprednisolone] Rash    Amoxicillin Rash    Penicillins Rash    Prevpac [[id number]-Jsgapnzjn-Pzphuihwl] Rash     Past Surgical History:   Procedure Laterality Date    FOOT SURGERY           Review of Systems   Constitutional: Positive for fatigue  HENT: Negative  Eyes: Negative  Respiratory: Negative  Cardiovascular: Negative  Gastrointestinal: Negative  Endocrine: Negative  Genitourinary: Negative  Musculoskeletal: Negative  Left plantar pain   Skin: Negative  Neurological: Positive for headaches  Hematological: Negative  Psychiatric/Behavioral: Negative  Objective:      /80   Pulse 80   Temp 98 8 °F (37 1 °C) (Tympanic)   Ht 5' 2 09" (1 577 m)   Wt 82 6 kg (182 lb)   SpO2 98%   BMI 33 20 kg/m²  BMI Counseling: Body mass index is 33 2 kg/m²  The BMI is above normal  Nutrition recommendations include reducing portion sizes, decreasing overall calorie intake, 3-5 servings of fruits/vegetables daily, reducing fast food intake, consuming healthier snacks, decreasing soda and/or juice intake, moderation in carbohydrate intake, increasing intake of lean protein, reducing intake of saturated fat and trans fat and reducing intake of cholesterol  Exercise recommendations include exercising 3-5 times per week  Physical Exam  Constitutional:       General: She is not in acute distress  Appearance: Normal appearance  She is not ill-appearing, toxic-appearing or diaphoretic  HENT:      Head: Normocephalic and atraumatic  Right Ear: External ear normal       Left Ear: External ear normal       Nose: Nose normal    Eyes:      General: No scleral icterus  Conjunctiva/sclera: Conjunctivae normal       Pupils: Pupils are equal, round, and reactive to light  Cardiovascular:      Rate and Rhythm: Normal rate and regular rhythm  Pulses: Normal pulses  Heart sounds: Normal heart sounds  No murmur heard  Pulmonary:      Effort: Pulmonary effort is normal       Breath sounds: Normal breath sounds  Abdominal:      General: Abdomen is flat  There is no distension  Tenderness: There is no abdominal tenderness  Musculoskeletal:         General: Tenderness (Of the left plantar fascia) present  Cervical back: Neck supple  Lymphadenopathy:      Cervical: Cervical adenopathy (Right-sided nontender cervical lymphadenopathy in the occipital area) present  Skin:     General: Skin is warm  Capillary Refill: Capillary refill takes less than 2 seconds  Coloration: Skin is not jaundiced  Findings: No bruising, erythema or rash  Neurological:      General: No focal deficit present  Mental Status: She is alert and oriented to person, place, and time  Mental status is at baseline     Psychiatric:         Mood and Affect: Mood normal          Behavior: Behavior normal

## 2021-09-30 ENCOUNTER — VBI (OUTPATIENT)
Dept: ADMINISTRATIVE | Facility: OTHER | Age: 51
End: 2021-09-30

## 2021-10-15 ENCOUNTER — HOSPITAL ENCOUNTER (OUTPATIENT)
Dept: RADIOLOGY | Age: 51
Discharge: HOME | End: 2021-10-15

## 2021-10-15 DIAGNOSIS — Z12.31 ENCOUNTER FOR SCREENING MAMMOGRAM FOR MALIGNANT NEOPLASM OF BREAST: ICD-10-CM

## 2021-12-16 ENCOUNTER — VBI (OUTPATIENT)
Dept: ADMINISTRATIVE | Facility: OTHER | Age: 51
End: 2021-12-16

## 2022-04-07 ENCOUNTER — VBI (OUTPATIENT)
Dept: ADMINISTRATIVE | Facility: OTHER | Age: 52
End: 2022-04-07

## 2022-06-09 ENCOUNTER — VBI (OUTPATIENT)
Dept: ADMINISTRATIVE | Facility: OTHER | Age: 52
End: 2022-06-09

## 2022-06-21 ENCOUNTER — RA CDI HCC (OUTPATIENT)
Dept: OTHER | Facility: HOSPITAL | Age: 52
End: 2022-06-21

## 2022-06-21 NOTE — PROGRESS NOTES
[facility]  coding opportunities       Chart reviewed, no opportunity found: CHART REVIEWED, NO OPPORTUNITY FOUND        Patients Insurance        Commercial Insurance: [address] [place] [address]

## 2022-07-15 LAB — HCV AB SER-ACNC: NEGATIVE

## 2022-07-19 DIAGNOSIS — D50.9 IRON DEFICIENCY ANEMIA, UNSPECIFIED IRON DEFICIENCY ANEMIA TYPE: Primary | ICD-10-CM

## 2022-08-02 ENCOUNTER — OFFICE VISIT (OUTPATIENT)
Dept: INTERNAL MEDICINE CLINIC | Facility: CLINIC | Age: 52
End: 2022-08-02
Payer: COMMERCIAL

## 2022-08-02 VITALS
WEIGHT: 188.8 LBS | SYSTOLIC BLOOD PRESSURE: 142 MMHG | BODY MASS INDEX: 33.45 KG/M2 | HEIGHT: 63 IN | OXYGEN SATURATION: 98 % | TEMPERATURE: 98.6 F | HEART RATE: 86 BPM | DIASTOLIC BLOOD PRESSURE: 72 MMHG

## 2022-08-02 DIAGNOSIS — R79.89 ELEVATED LIVER FUNCTION TESTS: ICD-10-CM

## 2022-08-02 DIAGNOSIS — I10 BENIGN ESSENTIAL HYPERTENSION: ICD-10-CM

## 2022-08-02 DIAGNOSIS — D50.9 IRON DEFICIENCY ANEMIA, UNSPECIFIED IRON DEFICIENCY ANEMIA TYPE: ICD-10-CM

## 2022-08-02 DIAGNOSIS — Z12.11 SCREENING FOR COLON CANCER: ICD-10-CM

## 2022-08-02 DIAGNOSIS — G47.9 SLEEP DISORDER: ICD-10-CM

## 2022-08-02 DIAGNOSIS — Z12.31 ENCOUNTER FOR SCREENING MAMMOGRAM FOR MALIGNANT NEOPLASM OF BREAST: Primary | ICD-10-CM

## 2022-08-02 PROBLEM — H60.312 ACUTE DIFFUSE OTITIS EXTERNA OF LEFT EAR: Status: RESOLVED | Noted: 2019-10-24 | Resolved: 2022-08-02

## 2022-08-02 PROCEDURE — 99214 OFFICE O/P EST MOD 30 MIN: CPT | Performed by: INTERNAL MEDICINE

## 2022-08-02 PROCEDURE — 3725F SCREEN DEPRESSION PERFORMED: CPT | Performed by: INTERNAL MEDICINE

## 2022-08-02 RX ORDER — AMLODIPINE AND VALSARTAN 5; 320 MG/1; MG/1
1 TABLET ORAL DAILY
Qty: 30 TABLET | Refills: 5 | Status: SHIPPED | OUTPATIENT
Start: 2022-08-02

## 2022-08-02 NOTE — ASSESSMENT & PLAN NOTE
Blood pressure is high normal but stable with amlodipine and valsartan  No changes are needed at this time

## 2022-08-02 NOTE — ASSESSMENT & PLAN NOTE
Patient sleeps on average 4 to 5 hours per night    Will likely require a home sleep study to evaluate further

## 2022-08-02 NOTE — ASSESSMENT & PLAN NOTE
Patient with a hemoglobin is 7 5  Ferritin level is less than 4  Patient will require endoscopic assessment both upper and lower rule out a source of occult bleeding  Will initiate iron replacement therapy    Will also check for celiac disease and possible iron malabsorption since she has been taking iron replacement

## 2022-08-02 NOTE — PROGRESS NOTES
Assessment/Plan:    Iron deficiency anemia  Patient with a hemoglobin is 7 5  Ferritin level is less than 4  Patient will require endoscopic assessment both upper and lower rule out a source of occult bleeding  Will initiate iron replacement therapy  Will also check for celiac disease and possible iron malabsorption since she has been taking iron replacement    Benign essential hypertension  Blood pressure is high normal but stable with amlodipine and valsartan  No changes are needed at this time  Elevated liver function tests  Liver function studies are now normal    Sleep disorder  Patient sleeps on average 4 to 5 hours per night  Will likely require a home sleep study to evaluate further       Diagnoses and all orders for this visit:    Encounter for screening mammogram for malignant neoplasm of breast  -     Mammo screening bilateral w 3d & cad; Future    Benign essential hypertension  -     amLODIPine-valsartan (EXFORGE) 5-320 MG per tablet; Take 1 tablet by mouth daily    Iron deficiency anemia, unspecified iron deficiency anemia type  -     Celiac Disease Antibody Profile; Future  -     Fe Fum-Vit C-Vit B12--60-0 01-1 MG CAPS; Take 1 capsule by mouth in the morning  -     Retic Count; Future  -     Basic metabolic panel; Future  -     CBC and Platelet; Future  -     Cologuard    Elevated liver function tests    Sleep disorder    Screening for colon cancer  -     Cologuard          Subjective:      Patient ID: Surendra Gutiérrez is a 46 y o  female  Patient presents for follow-up visit  She complains of feeling fatigued constantly  A review of her labs reveals a hemoglobin of 7 5, a serum ferritin level less than 4  Serum chemistries are within normal limits  Fasting glucose 103  Liver enzymes are within normal limits  C- Reactive protein is normal   Thyroid function is normal   Lipid profile is at goal   Hepatitis C studies are negative  Patient denies any changes in bowel habits  States her bowel movements have been normal   She has had heavy menstrual periods in the past but in recent months her cycles have been erratic and the bleeding has lessened  She is likely perimenopausal   She also complains of not being able to sleep very well  On average the patient states she sleeps for maybe 5 hours per night  She usually goes to bed around 11:00 a m  and awakens between 3 and 4:00 a m  in the morning and has been unable to get back to sleep        Family History   Problem Relation Age of Onset    Diabetes Mother         Diabetes Mellitus    Diabetes Other         Diabetes Mellitus    No Known Problems Father     No Known Problems Sister     No Known Problems Daughter     No Known Problems Maternal Grandmother     No Known Problems Maternal Grandfather     No Known Problems Paternal Grandmother     No Known Problems Paternal Grandfather     No Known Problems Sister     No Known Problems Maternal Aunt     No Known Problems Maternal Aunt     No Known Problems Maternal Aunt     No Known Problems Maternal Aunt     No Known Problems Maternal Aunt     No Known Problems Maternal Aunt     No Known Problems Maternal Aunt     No Known Problems Maternal Aunt     No Known Problems Maternal Aunt      Social History     Socioeconomic History    Marital status: /Civil Union     Spouse name: Not on file    Number of children: Not on file    Years of education: Not on file    Highest education level: Not on file   Occupational History    Not on file   Tobacco Use    Smoking status: Never Smoker    Smokeless tobacco: Never Used   Vaping Use    Vaping Use: Never used   Substance and Sexual Activity    Alcohol use: No    Drug use: No    Sexual activity: Not on file   Other Topics Concern    Not on file   Social History Narrative    Advance directive declined by patient     Social Determinants of Health     Financial Resource Strain: Not on file   Food Insecurity: Not on file Transportation Needs: Not on file   Physical Activity: Not on file   Stress: Not on file   Social Connections: Not on file   Intimate Partner Violence: Not on file   Housing Stability: Not on file     Past Medical History:   Diagnosis Date    Acute diffuse otitis externa of left ear 10/24/2019    Anemia     Atopic dermatitis 08/06/2015    Atypical chest pain 04/02/2015    BPPV (benign paroxysmal positional vertigo) 01/05/2017    Chest pain     Class 1 obesity with alveolar hypoventilation without serious comorbidity with body mass index (BMI) of 32 0 to 32 9 in adult ([facility] ) 7/20/2017    Gastroenteritis     Hypertension     Iron deficiency anemia 03/30/2017    Lateral epicondylitis of right elbow 1/11/2018       Current Outpatient Medications:     amLODIPine-valsartan (EXFORGE) 5-320 MG per tablet, Take 1 tablet by mouth daily, Disp: 30 tablet, Rfl: 5    Fe Fum-Vit C-Vit B12--60-0 01-1 MG CAPS, Take 1 capsule by mouth in the morning, Disp: 90 capsule, Rfl: 0    mometasone (ELOCON) 0 1 % ointment, Apply topically daily, Disp: 45 g, Rfl: 1  Allergies   Allergen Reactions    Medrol [Methylprednisolone] Rash    Amoxicillin Rash    Penicillins Rash    Prevpac [Biqpsqnak-Rlnzhrweg-Avvzwjunq] Rash     Past Surgical History:   Procedure Laterality Date    FOOT SURGERY           Review of Systems   Constitutional: Positive for fatigue (Constant)  Negative for activity change, appetite change, chills, diaphoresis, fever and unexpected weight change  HENT: Negative  Eyes: Negative  Respiratory: Negative  Cardiovascular: Negative  Gastrointestinal: Negative  Endocrine: Negative  Genitourinary: Negative  Musculoskeletal: Negative  Skin: Negative  Allergic/Immunologic: Negative  Neurological: Negative  Hematological: Negative  Psychiatric/Behavioral: Positive for sleep disturbance           Objective:      /72 (BP Location: Left arm, Patient Position: Sitting, Cuff Size: Standard)   Pulse 86   Temp 98 6 °F (37 °C) (Tympanic)   Ht 5' 3" (1 6 m)   Wt 85 6 kg (188 lb 12 8 oz)   SpO2 98%   BMI 33 44 kg/m²          Physical Exam  Constitutional:       General: She is not in acute distress  Appearance: Normal appearance  She is not ill-appearing, toxic-appearing or diaphoretic  HENT:      Head: Normocephalic and atraumatic  Eyes:      General: No scleral icterus  Conjunctiva/sclera: Conjunctivae normal       Pupils: Pupils are equal, round, and reactive to light  Cardiovascular:      Rate and Rhythm: Normal rate and regular rhythm  Pulses: Normal pulses  Heart sounds: Normal heart sounds  No murmur heard  Pulmonary:      Effort: Pulmonary effort is normal  No respiratory distress  Abdominal:      General: Abdomen is flat  Bowel sounds are normal  There is no distension  Tenderness: There is no abdominal tenderness  Musculoskeletal:         General: No swelling or deformity  Cervical back: Neck supple  No rigidity  Right lower leg: No edema  Left lower leg: No edema  Lymphadenopathy:      Cervical: No cervical adenopathy  Skin:     General: Skin is warm  Coloration: Skin is not jaundiced  Findings: No bruising, erythema or rash  Neurological:      General: No focal deficit present  Mental Status: She is alert and oriented to person, place, and time  Mental status is at baseline     Psychiatric:         Mood and Affect: Mood normal          Behavior: Behavior normal

## 2022-08-05 ENCOUNTER — TELEPHONE (OUTPATIENT)
Dept: INTERNAL MEDICINE CLINIC | Facility: CLINIC | Age: 52
End: 2022-08-05

## 2022-08-05 NOTE — TELEPHONE ENCOUNTER
Patient called stating her pharmacy has told her that they do not carry Rx Fe Fum-Vit C-Vit B12--60-0 01-1 MG CAPS      If an alternative can be sent over instead to [facility], [place]

## 2022-08-05 NOTE — TELEPHONE ENCOUNTER
All she needs is an over-the-counter iron supplement  Any iron supplement available other than ferrous sulfate   She can take ferrous gluconate and ferrous fumarate, ferrous bisglycinate

## 2022-08-08 ENCOUNTER — VBI (OUTPATIENT)
Dept: ADMINISTRATIVE | Facility: OTHER | Age: 52
End: 2022-08-08

## 2022-09-07 ENCOUNTER — VBI (OUTPATIENT)
Dept: ADMINISTRATIVE | Facility: OTHER | Age: 52
End: 2022-09-07

## 2022-11-22 ENCOUNTER — VBI (OUTPATIENT)
Dept: ADMINISTRATIVE | Facility: OTHER | Age: 52
End: 2022-11-22

## 2023-01-09 ENCOUNTER — VBI (OUTPATIENT)
Dept: ADMINISTRATIVE | Facility: OTHER | Age: 53
End: 2023-01-09

## 2023-03-31 ENCOUNTER — TELEPHONE (OUTPATIENT)
Dept: INTERNAL MEDICINE CLINIC | Facility: CLINIC | Age: 53
End: 2023-03-31

## 2023-07-18 ENCOUNTER — OFFICE VISIT (OUTPATIENT)
Dept: INTERNAL MEDICINE CLINIC | Facility: CLINIC | Age: 53
End: 2023-07-18
Payer: COMMERCIAL

## 2023-07-18 VITALS
BODY MASS INDEX: 34.38 KG/M2 | HEART RATE: 87 BPM | WEIGHT: 194 LBS | DIASTOLIC BLOOD PRESSURE: 86 MMHG | HEIGHT: 63 IN | OXYGEN SATURATION: 96 % | TEMPERATURE: 97.5 F | SYSTOLIC BLOOD PRESSURE: 160 MMHG

## 2023-07-18 DIAGNOSIS — Z12.12 ENCOUNTER FOR COLORECTAL CANCER SCREENING: ICD-10-CM

## 2023-07-18 DIAGNOSIS — Z13.1 SCREENING FOR DIABETES MELLITUS: ICD-10-CM

## 2023-07-18 DIAGNOSIS — Z12.11 ENCOUNTER FOR COLORECTAL CANCER SCREENING: ICD-10-CM

## 2023-07-18 DIAGNOSIS — Z23 NEED FOR DIPHTHERIA-TETANUS-PERTUSSIS (TDAP) VACCINE: ICD-10-CM

## 2023-07-18 DIAGNOSIS — Z12.31 ENCOUNTER FOR SCREENING MAMMOGRAM FOR MALIGNANT NEOPLASM OF BREAST: ICD-10-CM

## 2023-07-18 DIAGNOSIS — L08.9 INFECTION OF THUMB: Primary | ICD-10-CM

## 2023-07-18 DIAGNOSIS — I10 BENIGN ESSENTIAL HYPERTENSION: ICD-10-CM

## 2023-07-18 PROBLEM — R22.32 LOCALIZED SWELLING OF FINGER OF LEFT HAND: Status: ACTIVE | Noted: 2023-07-18

## 2023-07-18 PROCEDURE — 90715 TDAP VACCINE 7 YRS/> IM: CPT

## 2023-07-18 PROCEDURE — 90471 IMMUNIZATION ADMIN: CPT

## 2023-07-18 PROCEDURE — 99214 OFFICE O/P EST MOD 30 MIN: CPT | Performed by: INTERNAL MEDICINE

## 2023-07-18 RX ORDER — TRIAMTERENE AND HYDROCHLOROTHIAZIDE 37.5; 25 MG/1; MG/1
1 CAPSULE ORAL EVERY MORNING
Qty: 30 CAPSULE | Refills: 2 | Status: SHIPPED | OUTPATIENT
Start: 2023-07-18

## 2023-07-18 RX ORDER — CEPHALEXIN 500 MG/1
500 CAPSULE ORAL EVERY 6 HOURS SCHEDULED
Qty: 40 CAPSULE | Refills: 0 | Status: SHIPPED | OUTPATIENT
Start: 2023-07-18 | End: 2023-07-28

## 2023-07-18 NOTE — ASSESSMENT & PLAN NOTE
Blood pressure mildly elevated at time of visit at 160/86. Presently on a combination of amlodipine 5/valsartan 320 mg tablet daily. Have given an additional diuretic to be taken once a day. This is to be started after she takes the antibiotic and has no side effects from it.   Follow-up appointment coming up next month was recommended to get a full panel blood work prior to her physical.

## 2023-07-18 NOTE — ASSESSMENT & PLAN NOTE
Commend warm compress, elevation, wrap wound when at work. If antiBiotic causes of rash was recommended to stop the antibiotic and give us a call. Recommend probiotic when taking antibiotic.

## 2023-07-18 NOTE — PROGRESS NOTES
Name: Demi Lala      : 1970      MRN: [id number]  Encounter Provider: Sandra Chamberlain MD  Encounter Date: 2023   Encounter department: [address] [place] [address]     1. Infection of thumb  Assessment & Plan:  Commend warm compress, elevation, wrap wound when at work. If antiBiotic causes of rash was recommended to stop the antibiotic and give us a call. Recommend probiotic when taking antibiotic. Orders:  -     cephalexin (KEFLEX) 500 mg capsule; Take 1 capsule (500 mg total) by mouth every 6 (six) hours for 10 days    2. Need for diphtheria-tetanus-pertussis (Tdap) vaccine  -     TDAP VACCINE GREATER THAN OR EQUAL TO 8YO IM    3. Benign essential hypertension  Assessment & Plan:  Blood pressure mildly elevated at time of visit at 160/86. Presently on a combination of amlodipine 5/valsartan 320 mg tablet daily. Have given an additional diuretic to be taken once a day. This is to be started after she takes the antibiotic and has no side effects from it. Follow-up appointment coming up next month was recommended to get a full panel blood work prior to her physical.    Orders:  -     Ambulatory Referral to Gastroenterology; Future  -     Mammo screening bilateral w 3d & cad; Future; Expected date: 2023  -     cephalexin (KEFLEX) 500 mg capsule; Take 1 capsule (500 mg total) by mouth every 6 (six) hours for 10 days  -     triamterene-hydrochlorothiazide (DYAZIDE) 37.5-25 mg per capsule; Take 1 capsule by mouth every morning  -     CBC and differential; Future  -     Comprehensive metabolic panel; Future  -     Lipid Panel with Direct LDL reflex; Future  -     TSH, 3rd generation with Free T4 reflex; Future  -     UA w Reflex to Microscopic w Reflex to Culture    4. Encounter for colorectal cancer screening  -     Ambulatory Referral to Gastroenterology; Future    5.  Encounter for screening mammogram for malignant neoplasm of breast  -     Mammo screening bilateral w 3d & cad; Future; Expected date: 07/18/2023    6. Screening for diabetes mellitus  -     Hemoglobin A1C; Future           Subjective     Chief Complaint   Patient presents with   • Follow-up     On Left thumb the corner of the nail is black. Thumb is painful and  hard to bend and swollen since yesterday. O n Sunday patient fell down the stairs she is having a little left knee pain swollen and bruised   • Health Screening     Colonoscopy ordered mammogram ordered patient will call to schedule depression screen       HPI     Thumb is swollen and painful. Corner of nail is black with some pus in the corner of the nail. It is throbbing. Review of Systems   Constitutional: Positive for activity change. Respiratory: Positive for chest tightness. Negative for cough. Musculoskeletal: Positive for arthralgias. Skin: Positive for color change, rash and wound. Allergic/Immunologic: Positive for environmental allergies. All other systems reviewed and are negative.       Past Medical History:   Diagnosis Date   • Acute diffuse otitis externa of left ear 10/24/2019   • Anemia    • Atopic dermatitis 08/06/2015   • Atypical chest pain 04/02/2015   • BPPV (benign paroxysmal positional vertigo) 01/05/2017   • Chest pain    • Class 1 obesity with alveolar hypoventilation without serious comorbidity with body mass index (BMI) of 32.0 to 32.9 in adult [facility]) 7/20/2017   • Gastroenteritis    • Hypertension    • Iron deficiency anemia 03/30/2017   • Lateral epicondylitis of right elbow 1/11/2018     Past Surgical History:   Procedure Laterality Date   • FOOT SURGERY       Family History   Problem Relation Age of Onset   • Diabetes Mother         Diabetes Mellitus   • Diabetes Other         Diabetes Mellitus   • No Known Problems Father    • No Known Problems Sister    • No Known Problems Daughter    • No Known Problems Maternal Grandmother    • No Known Problems Maternal Grandfather    • No Known Problems Paternal Grandmother    • No Known Problems Paternal Grandfather    • No Known Problems Sister    • No Known Problems Maternal Aunt    • No Known Problems Maternal Aunt    • No Known Problems Maternal Aunt    • No Known Problems Maternal Aunt    • No Known Problems Maternal Aunt    • No Known Problems Maternal Aunt    • No Known Problems Maternal Aunt    • No Known Problems Maternal Aunt    • No Known Problems Maternal Aunt      Social History     Socioeconomic History   • Marital status: /Civil Union     Spouse name: None   • Number of children: None   • Years of education: None   • Highest education level: None   Occupational History   • None   Tobacco Use   • Smoking status: Never   • Smokeless tobacco: Never   Vaping Use   • Vaping Use: Never used   Substance and Sexual Activity   • Alcohol use: No   • Drug use: No   • Sexual activity: None   Other Topics Concern   • None   Social History Narrative    Advance directive declined by patient     Social Determinants of Health     Financial Resource Strain: Not on file   Food Insecurity: Not on file   Transportation Needs: Not on file   Physical Activity: Not on file   Stress: Not on file   Social Connections: Not on file   Intimate Partner Violence: Not on file   Housing Stability: Not on file     Current Outpatient Medications on File Prior to Visit   Medication Sig   • amLODIPine-valsartan (EXFORGE) 5-320 MG per tablet Take 1 tablet by mouth daily   • Fe Fum-Vit C-Vit B12--60-0.01-1 MG CAPS Take 1 capsule by mouth in the morning   • mometasone (ELOCON) 0.1 % ointment Apply topically daily     Allergies   Allergen Reactions   • Medrol [Methylprednisolone] Rash   • Amoxicillin Rash   • Penicillins Rash   • Prevpac [Gsizwepqp-Naykkzgwg-Pgfmuaqtr] Rash     Immunization History   Administered Date(s) Administered   • COVID-19 MODERNA VACC 0.5 ML IM 01/13/2021, 02/10/2021, 01/05/2022   • Hep B, adult 06/12/2006   • Td (adult), adsorbed 05/12/2006 • Tdap 07/18/2023       Objective     /86 (BP Location: Left arm, Patient Position: Sitting, Cuff Size: Large)   Pulse 87   Temp 97.5 °F (36.4 °C) (Temporal)   Ht 5' 2.6" (1.59 m)   Wt 88 kg (194 lb)   SpO2 96%   BMI 34.81 kg/m²     Physical Exam     Gen: NAD  Heent: atraumatic, normocephalic  Mouth: is moist  Neck: is supple, no JVD, no carotid bruits.    Heart: is regular Normal s1, s2,  Lungs: are clear to auscultation  Abd: soft, non tender, NABS  Ext: no edema, distal pulses normal  Skin: no rashes, ulcers  Mood: normal affect  Neuro: Cheyenne Jade MD

## 2023-07-26 ENCOUNTER — VBI (OUTPATIENT)
Dept: ADMINISTRATIVE | Facility: OTHER | Age: 53
End: 2023-07-26

## 2023-08-09 DIAGNOSIS — I10 BENIGN ESSENTIAL HYPERTENSION: ICD-10-CM

## 2023-08-09 RX ORDER — TRIAMTERENE AND HYDROCHLOROTHIAZIDE 37.5; 25 MG/1; MG/1
CAPSULE ORAL
Qty: 90 CAPSULE | Refills: 1 | OUTPATIENT
Start: 2023-08-09

## 2023-08-11 ENCOUNTER — VBI (OUTPATIENT)
Dept: ADMINISTRATIVE | Facility: OTHER | Age: 53
End: 2023-08-11

## 2023-08-24 LAB — HBA1C MFR BLD HPLC: 5.7 %

## 2023-08-29 ENCOUNTER — OFFICE VISIT (OUTPATIENT)
Dept: INTERNAL MEDICINE CLINIC | Age: 53
End: 2023-08-29
Payer: COMMERCIAL

## 2023-08-29 VITALS
OXYGEN SATURATION: 98 % | DIASTOLIC BLOOD PRESSURE: 96 MMHG | WEIGHT: 195 LBS | HEIGHT: 63 IN | TEMPERATURE: 98 F | HEART RATE: 77 BPM | SYSTOLIC BLOOD PRESSURE: 142 MMHG | BODY MASS INDEX: 34.55 KG/M2

## 2023-08-29 DIAGNOSIS — L08.9 INFECTION OF THUMB: ICD-10-CM

## 2023-08-29 DIAGNOSIS — I10 BENIGN ESSENTIAL HYPERTENSION: ICD-10-CM

## 2023-08-29 DIAGNOSIS — D50.9 IRON DEFICIENCY ANEMIA, UNSPECIFIED IRON DEFICIENCY ANEMIA TYPE: Primary | ICD-10-CM

## 2023-08-29 PROCEDURE — 99213 OFFICE O/P EST LOW 20 MIN: CPT | Performed by: INTERNAL MEDICINE

## 2023-08-29 RX ORDER — FERROUS SULFATE 325(65) MG
325 TABLET ORAL
COMMUNITY

## 2023-08-29 RX ORDER — AMLODIPINE AND VALSARTAN 10; 320 MG/1; MG/1
1 TABLET ORAL DAILY
Qty: 30 TABLET | Refills: 5 | Status: SHIPPED | OUTPATIENT
Start: 2023-08-29

## 2023-08-29 NOTE — ASSESSMENT & PLAN NOTE
Left thumb infection has cleared.   She has a deformed nail but there is no active infection and the nail should grow out and be replaced without difficulty

## 2023-08-29 NOTE — ASSESSMENT & PLAN NOTE
Significant improvement on iron replacement therapy.   Hemoglobin is up to 11.9 from 7.5 and RBC indices are normal.  We will decrease ferrous sulfate to Monday Wednesday and Friday

## 2023-08-29 NOTE — ASSESSMENT & PLAN NOTE
Blood pressure remains suboptimally controlled.   We will increase her dose to amlodipine 10 mg valsartan 320 mg continue Dyazide capsules

## 2023-08-29 NOTE — PROGRESS NOTES
Name: Nandini Echeverria      : 1970      MRN: [id number]  Encounter Provider: Tracie Kwong MD  Encounter Date: 2023   Encounter department: [address] [place] [address] [place]  [facility]    Assessment & Plan     1. Iron deficiency anemia, unspecified iron deficiency anemia type  Assessment & Plan:  Significant improvement on iron replacement therapy. Hemoglobin is up to 11.9 from 7.5 and RBC indices are normal.  We will decrease ferrous sulfate to  and Friday    Orders:  -     CBC and differential; Future; Expected date: 2024  -     Comprehensive metabolic panel; Future; Expected date: 2024  -     Iron; Future; Expected date: 2024  -     UA (URINE) with reflex to Scope; Future; Expected date: 2024    2. Benign essential hypertension  Assessment & Plan:  Blood pressure remains suboptimally controlled. We will increase her dose to amlodipine 10 mg valsartan 320 mg continue Dyazide capsules    Orders:  -     amLODIPine-valsartan (EXFORGE)  MG per tablet; Take 1 tablet by mouth daily  -     CBC and differential; Future; Expected date: 2024  -     Comprehensive metabolic panel; Future; Expected date: 2024  -     Iron; Future; Expected date: 2024  -     UA (URINE) with reflex to Scope; Future; Expected date: 2024    3. Infection of thumb  Assessment & Plan:  Left thumb infection has cleared. She has a deformed nail but there is no active infection and the nail should grow out and be replaced without difficulty             Subjective      Patient presents to the office for follow-up visit. She is doing very well and she feels very well. She was previously seen for an infection under the left thumbnail which has cleared with antibiotic therapy. There is a scarred and partially a avulsed nail which should grow out and be replaced by healthy nail. Labs are reviewed.   Her hemoglobin is much improved on iron therapy up to 11.9 from 7.5 in July.  She has not yet heard from GI as far as scheduling endoscopic studies. But she reports no change in her bowel habits. Her appetite is good she feels well she does not feel as fatigued as previously. She has no complaints of any pain or discomfort. Review of Systems   Constitutional: Negative. Negative for activity change, appetite change, chills, diaphoresis, fatigue, fever and unexpected weight change. HENT: Negative. Eyes: Negative. Respiratory: Negative. Cardiovascular: Negative. Gastrointestinal: Negative. Endocrine: Negative. Genitourinary: Negative. Musculoskeletal: Negative. Skin: Negative. Neurological: Negative. Hematological: Negative. Psychiatric/Behavioral: The patient is not nervous/anxious. Current Outpatient Medications on File Prior to Visit   Medication Sig   • ferrous sulfate 325 (65 Fe) mg tablet Take 325 mg by mouth daily with breakfast   • mometasone (ELOCON) 0.1 % ointment Apply topically daily   • triamterene-hydrochlorothiazide (DYAZIDE) 37.5-25 mg per capsule Take 1 capsule by mouth every morning   • [DISCONTINUED] amLODIPine-valsartan (EXFORGE) 5-320 MG per tablet Take 1 tablet by mouth daily   • [DISCONTINUED] Fe Fum-Vit C-Vit B12--60-0.01-1 MG CAPS Take 1 capsule by mouth in the morning       Objective     /96 (BP Location: Left arm, Patient Position: Sitting, Cuff Size: Standard)   Pulse 77   Temp 98 °F (36.7 °C) (Tympanic)   Ht 5' 3.19" (1.605 m)   Wt 88.5 kg (195 lb)   SpO2 98%   BMI 34.34 kg/m²     Physical Exam  Vitals reviewed. Constitutional:       General: She is not in acute distress. Appearance: Normal appearance. She is normal weight. She is not ill-appearing, toxic-appearing or diaphoretic. HENT:      Head: Normocephalic and atraumatic.       Right Ear: External ear normal.      Left Ear: External ear normal.   Eyes:      Conjunctiva/sclera: Conjunctivae normal.      Pupils: Pupils are equal, round, and reactive to light. Cardiovascular:      Rate and Rhythm: Normal rate and regular rhythm. Pulmonary:      Effort: Pulmonary effort is normal. No respiratory distress. Abdominal:      General: Abdomen is flat. There is no distension. Musculoskeletal:         General: No swelling. Cervical back: Neck supple. No rigidity. Right lower leg: No edema. Left lower leg: No edema. Skin:     General: Skin is warm. Coloration: Skin is not jaundiced. Findings: No bruising, erythema or rash. Neurological:      General: No focal deficit present. Mental Status: She is alert and oriented to person, place, and time. Mental status is at baseline.    Psychiatric:         Mood and Affect: Mood normal.         Behavior: Behavior normal.       Jossue Moser MD

## 2023-08-30 ENCOUNTER — TELEPHONE (OUTPATIENT)
Dept: GASTROENTEROLOGY | Facility: CLINIC | Age: 53
End: 2023-08-30

## 2023-08-30 ENCOUNTER — TELEPHONE (OUTPATIENT)
Dept: INTERNAL MEDICINE CLINIC | Facility: OTHER | Age: 53
End: 2023-08-30

## 2023-08-30 NOTE — TELEPHONE ENCOUNTER
At last office visit pt stated she is waiting for a call from GI to schedule colonoscopy. Thank you.

## 2023-10-09 ENCOUNTER — TELEMEDICINE (OUTPATIENT)
Dept: INTERNAL MEDICINE CLINIC | Age: 53
End: 2023-10-09
Payer: COMMERCIAL

## 2023-10-09 ENCOUNTER — TELEPHONE (OUTPATIENT)
Dept: INTERNAL MEDICINE CLINIC | Age: 53
End: 2023-10-09

## 2023-10-09 VITALS — TEMPERATURE: 101 F

## 2023-10-09 DIAGNOSIS — B34.9 ACUTE VIRAL SYNDROME: ICD-10-CM

## 2023-10-09 DIAGNOSIS — U07.1 COVID-19 VIRUS INFECTION: Primary | ICD-10-CM

## 2023-10-09 DIAGNOSIS — Z12.11 SCREENING FOR MALIGNANT NEOPLASM OF COLON: ICD-10-CM

## 2023-10-09 PROCEDURE — 99213 OFFICE O/P EST LOW 20 MIN: CPT | Performed by: INTERNAL MEDICINE

## 2023-10-09 RX ORDER — BENZONATATE 200 MG/1
200 CAPSULE ORAL 3 TIMES DAILY PRN
Qty: 30 CAPSULE | Refills: 0 | Status: SHIPPED | OUTPATIENT
Start: 2023-10-09

## 2023-10-09 NOTE — PATIENT INSTRUCTIONS
WHILE SICK WITH THE COVID-19 INFECTION:  -PLEASE SELF ISOLATE TILL DAY 5  -PLEASE [address] YOUR HANDS WITH SOAP AND WATER, LIBERALLY, FOR AT LEAST 20 SECONDS AT A TIME  -WEAR A FACE MASK WHEN IN THE PRESENCE OF ANY OTHER PERSON TILL DAY 10  -PLEASE TAKE TYLENOL FOR ANY ACHES AND PAINS OF FEVER, ROBITUSSIN FOR DRY COUGH, MUCINEX FOR PRODUCTIVE COUGH, AND USE WARM SALT WATER GARGLES FOR SORE THROAT.   -CALL THE OFFICE OR GO TO THE EMERGENCY DEPARTMENT IF YOU SHOULD DEVELOP WORSENING CHEST PAIN, FEVER, SHORTNESS OF BREATH, COUGH OR AN INABILITY TO TOLERATE ORAL INTAKE

## 2023-10-09 NOTE — PROGRESS NOTES
COVID-19 Outpatient Progress Note    Assessment/Plan:    Covid 19 infection  - Pt tested positive via a home rapid COVID 19 test and wemanage her symptomatically  - she has been counseled to keep very well hydrated, to use OTC tylenol for her aches and pains and warm salt water gargles for any sore throat , Flonase for rhinitis and congestion, we will prescribe benzonatate for her dry cough  - she was also counseled to self quarantine till d ay 5 and to take a daily multivitamin. - she should wash her hands liberally with soap and water for at least 20 seconds at a time and wear a face mask when in the presence of any other person till day 10  - we will give pt an excuse note for work as requested  - if symptoms persist or worsen pt should call the office or go to the ED especially if she develops worsening SOB, chest pain, fever or an inability to tolerate oral intake  - follow-up in 1 week if symptoms persist.      Problem List Items Addressed This Visit        Other    COVID-19 virus infection - Primary    Relevant Medications    benzonatate (TESSALON) 200 MG capsule    Acute viral syndrome    Relevant Medications    benzonatate (TESSALON) 200 MG capsule    Screening for malignant neoplasm of colon    Relevant Orders    Ambulatory Referral to Gastroenterology      Disposition:     Discussed symptom directed medication options with patient. Discussed vitamin D, vitamin C, and/or zinc supplementation with patient. I have spent a total time of 16 minutes on the day of the encounter for this patient including instructions for management, patient and family education, importance of treatment compliance, impressions and counseling/coordination of care.        Encounter provider: Tomás Sterling DO     Provider located at: [facility] [address] [id number] [address] [address]     Recent Visits  No visits were found meeting these conditions. Showing recent visits within past 7 days and meeting all other requirements  Today's Visits  Date Type Provider Dept   10/09/23 Telemedicine [facility]   Showing today's visits and meeting all other requirements  Future Appointments  No visits were found meeting these conditions. Showing future appointments within next 150 days and meeting all other requirements     This virtual check-in was done via [facility] and patient was informed that this is a secure, HIPAA-compliant platform. She agrees to proceed. Patient agrees to participate in a virtual check in via telephone or video visit instead of presenting to the office to address urgent/immediate medical needs. Patient is aware this is a billable service. She acknowledged consent and understanding of privacy and security of the video platform. The patient has agreed to participate and understands they can discontinue the visit at any time. After connecting through [facility], the patient was identified by name and date of birth. Nithya Redman was informed that this was a telemedicine visit and that the exam was being conducted confidentially over secure lines. My office door was closed. No one else was in the room. Nithya Redman acknowledged consent and understanding of privacy and security of the telemedicine visit. I informed the patient that I have reviewed her record in Epic and presented the opportunity for her to ask any questions regarding the visit today. The patient agreed to participate. Verification of patient location:  Patient is located in the following state in which I hold an active license: [place]    Subjective:   Nithya Redman is a 48 y.o. female who is concerned about COVID-19.  Patient's symptoms include fever (t max 100.1 degrees farenheit, taking robitussin, theraflu, tylenol), chills, fatigue, nasal congestion, sore throat, cough (dry cough), nausea (when she coughs so much she feels like she will throw up ), myalgias and headache. Patient denies rhinorrhea, anosmia, loss of taste, shortness of breath, chest tightness, abdominal pain, vomiting and diarrhea. - Date of symptom onset: 10/9/2023      COVID-19 vaccination status: Fully vaccinated with booster    Exposure:   Contact with a person who is under investigation (PUI) for or who is positive for COVID-19 within the last 14 days?: No    Hospitalized recently for fever and/or lower respiratory symptoms?: No      Currently a healthcare worker that is involved in direct patient care?: No      Works in a special setting where the risk of COVID-19 transmission may be high? (this may include long-term care, correctional and [facility] facilities; homeless shelters; assisted-living facilities and group homes.): No      Resident in a special setting where the risk of COVID-19 transmission may be high? 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", "[address]"    Review of Systems   Constitutional: Positive for appetite change (anorexia but no change in sense of smell or taste), chills, diaphoresis (mild), fatigue and fever (t max 100.1 degrees farenheit, taking robitussin, theraflu, tylenol). Negative for activity change and unexpected weight change. HENT: Positive for congestion, ear pain, sinus pressure and sore throat. Negative for postnasal drip and rhinorrhea. Eyes: Negative for pain. Respiratory: Positive for cough (dry cough). Negative for choking, chest tightness, shortness of breath and wheezing. Cardiovascular: Positive for chest pain (when she coughs). Negative for palpitations and leg swelling.    Gastrointestinal: Positive for nausea (when she coughs so much she feels like she will throw up ). Negative for abdominal pain, constipation, diarrhea and vomiting. Genitourinary: Negative for dysuria and hematuria. Musculoskeletal: Positive for arthralgias and myalgias. Negative for back pain, gait problem, joint swelling and neck stiffness. Skin: Negative for pallor and rash. Neurological: Positive for headaches. Negative for dizziness, tremors, seizures, syncope and light-headedness. Hematological: Negative for adenopathy. Psychiatric/Behavioral: Negative for behavioral problems. Current Outpatient Medications on File Prior to Visit   Medication Sig   • amLODIPine-valsartan (EXFORGE)  MG per tablet Take 1 tablet by mouth daily   • ferrous sulfate 325 (65 Fe) mg tablet Take 325 mg by mouth daily with breakfast   • mometasone (ELOCON) 0.1 % ointment Apply topically daily   • triamterene-hydrochlorothiazide (DYAZIDE) 37.5-25 mg per capsule Take 1 capsule by mouth every morning       Objective:    Temp (!) 101 °F (38.3 °C) (Axillary) Comment: per pt - axillary       Physical Exam  Constitutional:       General: She is not in acute distress. Appearance: She is not ill-appearing, toxic-appearing or diaphoretic. HENT:      Head: Normocephalic and atraumatic. Eyes:      General: No scleral icterus. Right eye: No discharge. Left eye: No discharge. Pulmonary:      Effort: No respiratory distress (no conversational dyspnea). Abdominal:      Tenderness: There is no abdominal tenderness. Neurological:      Mental Status: She is oriented to person, place, and time.    Psychiatric:         Behavior: Behavior normal.       Pat Cruz DO

## 2023-10-09 NOTE — TELEPHONE ENCOUNTER
Dr. Gunenr Persaud would like this patient to be seen in one week by her PCP. I don't have any availabilty for Dr. Sheri Willis except the booking with out his consent for Tuesday 10/17/2023 with him, but I need his ok. This if or a follow up from COVID positive.   Wasn't sure if he wanted her to be a virtual or a follow up in person    Please contact the patient when you get his consent from him to make the appt

## 2023-10-09 NOTE — LETTER
October 9, 2023     Patient: April Bañuelos  YOB: 1970  Date of Visit: 10/9/2023      To Whom it May Concern:    Yaneli Swartz is under my professional care. April was seen in my office on 10/9/2023. April may return to work on 10/15/2023 . If you have any questions or concerns, please don't hesitate to call.          Sincerely,          Pat Cruz,         CC: No Recipients

## 2023-10-09 NOTE — PROGRESS NOTES
Virtual Brief Visit    This Visit is being completed by telephone. The Patient is located at {Amb Virtual Patient Location:[address]} and in the following state in which I hold an active license { amb virtual patient location:[address]}    The patient was identified by name and date of birth. Rosalinda Morgan was informed that this is a telemedicine visit and that the visit is being conducted through {AMB VIRTUAL VISIT GNHOJD:74301}. {Telemedicine confidentiality :66590} {Telemedicine participants:55474}  She acknowledged consent and understanding of privacy and security of the video platform. The patient has agreed to participate and understands they can discontinue the visit at any time. Patient is aware this is a billable service. Assessment/Plan:    Problem List Items Addressed This Visit    None  Visit Diagnoses     COVID-19 virus infection    -  Primary          Recent Visits  No visits were found meeting these conditions. Showing recent visits within past 7 days and meeting all other requirements  Future Appointments  No visits were found meeting these conditions.   Showing future appointments within next 150 days and meeting all other requirements         Visit Time  Total Visit Duration: ***

## 2023-10-10 NOTE — TELEPHONE ENCOUNTER
Spoke with patient and she will be back to work next Monday and would need after 3:30 p.m. Andrea Almodovar   She is able to do a virtual if that would be ok for you but she can't do that virtual at the time you provided

## 2023-10-13 NOTE — TELEPHONE ENCOUNTER
Called patient and left message that Dr Wallace Reed said she does not have to come in for an appointment next week if she is feeling well. Told her to call the office back if she would like an appointment before her regular follow up visit on 3/19/24.

## 2023-12-08 PROBLEM — Z12.11 SCREENING FOR MALIGNANT NEOPLASM OF COLON: Status: RESOLVED | Noted: 2023-10-09 | Resolved: 2023-12-08

## 2023-12-14 ENCOUNTER — VBI (OUTPATIENT)
Dept: ADMINISTRATIVE | Facility: OTHER | Age: 53
End: 2023-12-14

## 2024-03-09 LAB
ALBUMIN SERPL-MCNC: 4.1 G/DL (ref 3.5–5.7)
ALP SERPL-CCNC: 65 U/L (ref 35–120)
ALT SERPL-CCNC: 31 U/L
ANION GAP SERPL CALCULATED.3IONS-SCNC: 8 MMOL/L (ref 3–11)
AST SERPL-CCNC: 25 U/L
BASOPHILS # BLD AUTO: 0 THOU/CMM (ref 0–0.1)
BASOPHILS NFR BLD AUTO: 1 %
BILIRUB SERPL-MCNC: 0.5 MG/DL (ref 0.2–1)
BUN SERPL-MCNC: 12 MG/DL (ref 7–25)
CALCIUM SERPL-MCNC: 9.5 MG/DL (ref 8.5–10.1)
CHLORIDE SERPL-SCNC: 106 MMOL/L (ref 100–109)
CO2 SERPL-SCNC: 25 MMOL/L (ref 21–31)
CREAT SERPL-MCNC: 0.57 MG/DL (ref 0.4–1.1)
CYTOLOGY CMNT CVX/VAG CYTO-IMP: ABNORMAL
DIFFERENTIAL METHOD BLD: ABNORMAL
EOSINOPHIL # BLD AUTO: 0.2 THOU/CMM (ref 0–0.5)
EOSINOPHIL NFR BLD AUTO: 2 %
ERYTHROCYTE [DISTWIDTH] IN BLOOD BY AUTOMATED COUNT: 14.1 % (ref 12–16)
GFR/BSA.PRED SERPLBLD CYS-BASED-ARV: 108 ML/MIN/{1.73_M2}
GLUCOSE SERPL-MCNC: 106 MG/DL (ref 65–99)
HCT VFR BLD AUTO: 39.8 % (ref 35–43)
HGB BLD-MCNC: 13.2 G/DL (ref 11.5–14.5)
IRON SERPL-MCNC: 42 UG/DL (ref 50–212)
LYMPHOCYTES # BLD AUTO: 2 THOU/CMM (ref 1–3)
LYMPHOCYTES NFR BLD AUTO: 24 %
MCH RBC QN AUTO: 28.3 PG (ref 26–34)
MCHC RBC AUTO-ENTMCNC: 33 G/DL (ref 32–37)
MCV RBC AUTO: 86 FL (ref 80–100)
MONOCYTES # BLD AUTO: 0.6 THOU/CMM (ref 0.3–1)
MONOCYTES NFR BLD AUTO: 7 %
NEUTROPHILS # BLD AUTO: 5.3 THOU/CMM (ref 1.8–7.8)
NEUTROPHILS NFR BLD AUTO: 66 %
PLATELET # BLD AUTO: 199 THOU/CMM (ref 140–350)
PMV BLD REES-ECKER: 11.8 FL (ref 7.5–11.3)
POTASSIUM SERPL-SCNC: 5.2 MMOL/L (ref 3.5–5.2)
PROT SERPL-MCNC: 7.1 G/DL (ref 6.3–8.3)
RBC # BLD AUTO: 4.66 MILL/CMM (ref 3.7–4.7)
SODIUM SERPL-SCNC: 139 MMOL/L (ref 135–145)
WBC # BLD AUTO: 8.1 THOU/CMM (ref 4–10)

## 2024-03-13 ENCOUNTER — RA CDI HCC (OUTPATIENT)
Dept: OTHER | Facility: HOSPITAL | Age: 54
End: 2024-03-13

## 2024-03-13 NOTE — PROGRESS NOTES
HCC coding opportunities       Chart reviewed, no opportunity found: CHART REVIEWED, NO OPPORTUNITY FOUND   This is a reminder to address (resolve/update/assess) ALL HCC (risk adjustment) codes as found on active problem list for 2024 as patient scores reset to zero SAVI.  Also, just a reminder to please review and assess all other chronic conditions for 2024     Patients Insurance        Commercial Insurance: [employer]

## 2024-03-19 ENCOUNTER — OFFICE VISIT (OUTPATIENT)
Age: 54
End: 2024-03-19
Payer: COMMERCIAL

## 2024-03-19 VITALS
DIASTOLIC BLOOD PRESSURE: 92 MMHG | OXYGEN SATURATION: 98 % | TEMPERATURE: 98.1 F | HEIGHT: 63 IN | SYSTOLIC BLOOD PRESSURE: 146 MMHG | WEIGHT: 191.2 LBS | BODY MASS INDEX: 33.88 KG/M2 | HEART RATE: 76 BPM

## 2024-03-19 DIAGNOSIS — I10 BENIGN ESSENTIAL HYPERTENSION: Primary | ICD-10-CM

## 2024-03-19 DIAGNOSIS — L20.81 ATOPIC NEURODERMATITIS: ICD-10-CM

## 2024-03-19 DIAGNOSIS — D50.8 OTHER IRON DEFICIENCY ANEMIA: ICD-10-CM

## 2024-03-19 PROBLEM — M77.11 LATERAL EPICONDYLITIS OF RIGHT ELBOW: Status: RESOLVED | Noted: 2018-01-11 | Resolved: 2024-03-19

## 2024-03-19 PROBLEM — R79.89 ELEVATED LIVER FUNCTION TESTS: Status: RESOLVED | Noted: 2018-04-26 | Resolved: 2024-03-19

## 2024-03-19 PROBLEM — U07.1 COVID-19 VIRUS INFECTION: Status: RESOLVED | Noted: 2023-10-09 | Resolved: 2024-03-19

## 2024-03-19 PROCEDURE — 99213 OFFICE O/P EST LOW 20 MIN: CPT | Performed by: INTERNAL MEDICINE

## 2024-03-19 RX ORDER — MOMETASONE FUROATE 1 MG/G
OINTMENT TOPICAL DAILY
Qty: 45 G | Refills: 1 | Status: SHIPPED | OUTPATIENT
Start: 2024-03-19

## 2024-03-19 RX ORDER — VALSARTAN 320 MG/1
320 TABLET ORAL DAILY
Qty: 90 TABLET | Refills: 3 | Status: SHIPPED | OUTPATIENT
Start: 2024-03-19

## 2024-03-19 NOTE — PROGRESS NOTES
Name: April Casey      : 1970      MRN: [id number]  Encounter Provider: Ortega Delgado MD  Encounter Date: 3/19/2024   Encounter department: [facility]    Assessment & Plan     1. Benign essential hypertension  Assessment & Plan:  Amlodipine valsartan discontinued.  Will continue valsartan 320 mg daily.    Orders:  -     valsartan (DIOVAN) 320 MG tablet; Take 1 tablet (320 mg total) by mouth daily  -     Comprehensive metabolic panel; Future; Expected date: 2024    2. Atopic neurodermatitis  Assessment & Plan:  Elocon was renewed.    Orders:  -     mometasone (ELOCON) 0.1 % ointment; Apply topically daily    3. Other iron deficiency anemia  Assessment & Plan:  Iron deficiency without anemia.  Patient to continue ferrous sulfate 325 mg daily.  Repeat iron levels in 6 months    Orders:  -     CBC and differential; Future; Expected date: 2024  -     Iron; Future; Expected date: 2024           Subjective      Patient presents to the office for 6-month follow-up.  She has been doing well.  She did develop some peripheral edema while taking pain valsartan.  She discontinued the medication and her edema resolved.  She resumed the medication only to have her edema recur so she has now discontinued the valsartan.  Consequently her blood pressure in the office is mildly elevated so we will resume valsartan alone and discontinue amlodipine.  Labs are reviewed.  Her CBC and metabolic panel are essentially normal without any major metabolic abnormalities.  Serum iron level remains low 42 but she is not anemic.  Hemoglobin is 13.2 with normal RBC indices.  Metabolic panel is also essentially normal.  Serum chemistries are all within normal limits, liver functions and kidney function are within normal.  Fasting glucose is minimally elevated not concerning for diabetes.      Review of Systems   Constitutional: Negative.    HENT: Negative.     Eyes: Negative.   "  Respiratory: Negative.     Cardiovascular: Negative.    Gastrointestinal: Negative.    Endocrine: Negative.    Genitourinary: Negative.    Musculoskeletal: Negative.    Skin:  Positive for rash (Intermittently responsive to topical steroids).   Allergic/Immunologic: Negative.    Neurological: Negative.    Hematological: Negative.    Psychiatric/Behavioral: Negative.         Current Outpatient Medications on File Prior to Visit   Medication Sig    ferrous sulfate 325 (65 Fe) mg tablet Take 325 mg by mouth daily with breakfast    [DISCONTINUED] mometasone (ELOCON) 0.1 % ointment Apply topically daily    [DISCONTINUED] amLODIPine-valsartan (EXFORGE)  MG per tablet Take 1 tablet by mouth daily (Patient not taking: Reported on 3/19/2024)    [DISCONTINUED] benzonatate (TESSALON) 200 MG capsule Take 1 capsule (200 mg total) by mouth 3 (three) times a day as needed for cough    [DISCONTINUED] triamterene-hydrochlorothiazide (DYAZIDE) 37.5-25 mg per capsule Take 1 capsule by mouth every morning (Patient not taking: Reported on 3/19/2024)       Objective     /92 (BP Location: Left arm, Patient Position: Sitting, Cuff Size: Standard)   Pulse 76   Temp 98.1 °F (36.7 °C) (Temporal)   Ht 5' 2.8\" (1.595 m)   Wt 86.7 kg (191 lb 3.2 oz)   SpO2 98%   BMI 34.09 kg/m²     Physical Exam  Vitals reviewed.   Constitutional:       General: She is not in acute distress.     Appearance: Normal appearance. She is normal weight. She is not ill-appearing, toxic-appearing or diaphoretic.   HENT:      Head: Normocephalic and atraumatic.      Right Ear: External ear normal.      Left Ear: External ear normal.      Nose: Nose normal.   Eyes:      Conjunctiva/sclera: Conjunctivae normal.      Pupils: Pupils are equal, round, and reactive to light.   Cardiovascular:      Rate and Rhythm: Normal rate and regular rhythm.      Pulses: Normal pulses.      Heart sounds: Normal heart sounds. No murmur heard.  Pulmonary:      Effort: " Pulmonary effort is normal. No respiratory distress.      Breath sounds: Normal breath sounds. No wheezing or rales.   Abdominal:      General: Abdomen is flat. There is no distension.   Musculoskeletal:         General: Deformity (Mild enlargement of the right sternoclavicular joint) present. No swelling.      Cervical back: Neck supple. No rigidity.      Right lower leg: No edema.      Left lower leg: No edema.   Skin:     General: Skin is warm.      Coloration: Skin is not jaundiced.      Findings: No bruising, erythema or rash.   Neurological:      General: No focal deficit present.      Mental Status: She is alert and oriented to person, place, and time. Mental status is at baseline.   Psychiatric:         Mood and Affect: Mood normal.         Behavior: Behavior normal.       Ortega Delgado MD

## 2024-03-19 NOTE — ASSESSMENT & PLAN NOTE
Iron deficiency without anemia.  Patient to continue ferrous sulfate 325 mg daily.  Repeat iron levels in 6 months

## 2024-04-05 ENCOUNTER — HOSPITAL ENCOUNTER (OUTPATIENT)
Dept: RADIOLOGY | Age: 54
Discharge: HOME | End: 2024-04-05
Payer: COMMERCIAL

## 2024-04-05 VITALS — BODY MASS INDEX: 33.84 KG/M2 | HEIGHT: 63 IN | WEIGHT: 191 LBS

## 2024-04-05 DIAGNOSIS — I10 BENIGN ESSENTIAL HYPERTENSION: ICD-10-CM

## 2024-04-05 DIAGNOSIS — Z12.31 ENCOUNTER FOR SCREENING MAMMOGRAM FOR MALIGNANT NEOPLASM OF BREAST: ICD-10-CM

## 2024-04-05 PROCEDURE — 77063 BREAST TOMOSYNTHESIS BI: CPT

## 2024-04-05 PROCEDURE — 77067 SCR MAMMO BI INCL CAD: CPT

## 2024-04-09 DIAGNOSIS — R92.1 BREAST CALCIFICATIONS: Primary | ICD-10-CM

## 2024-04-10 ENCOUNTER — VBI (OUTPATIENT)
Dept: ADMINISTRATIVE | Facility: OTHER | Age: 54
End: 2024-04-10

## 2024-04-29 ENCOUNTER — APPOINTMENT (OUTPATIENT)
Dept: LAB | Age: 54
End: 2024-04-29
Payer: COMMERCIAL

## 2024-04-29 ENCOUNTER — OFFICE VISIT (OUTPATIENT)
Dept: INTERNAL MEDICINE CLINIC | Age: 54
End: 2024-04-29
Payer: COMMERCIAL

## 2024-04-29 ENCOUNTER — APPOINTMENT (OUTPATIENT)
Dept: RADIOLOGY | Age: 54
End: 2024-04-29
Payer: COMMERCIAL

## 2024-04-29 VITALS
DIASTOLIC BLOOD PRESSURE: 98 MMHG | HEART RATE: 77 BPM | WEIGHT: 191.8 LBS | BODY MASS INDEX: 33.98 KG/M2 | SYSTOLIC BLOOD PRESSURE: 160 MMHG | TEMPERATURE: 98.9 F | OXYGEN SATURATION: 98 %

## 2024-04-29 DIAGNOSIS — M25.511 ACUTE PAIN OF RIGHT SHOULDER: Primary | ICD-10-CM

## 2024-04-29 DIAGNOSIS — Z13.1 SCREENING FOR DIABETES MELLITUS: ICD-10-CM

## 2024-04-29 DIAGNOSIS — M25.511 ACUTE PAIN OF RIGHT SHOULDER: ICD-10-CM

## 2024-04-29 DIAGNOSIS — D50.9 IRON DEFICIENCY ANEMIA, UNSPECIFIED IRON DEFICIENCY ANEMIA TYPE: ICD-10-CM

## 2024-04-29 DIAGNOSIS — I10 BENIGN ESSENTIAL HYPERTENSION: ICD-10-CM

## 2024-04-29 LAB
CRP SERPL QL: 12 MG/L
ERYTHROCYTE [SEDIMENTATION RATE] IN BLOOD: 21 MM/HOUR (ref 0–29)
URATE SERPL-MCNC: 3.7 MG/DL (ref 2–7.5)

## 2024-04-29 PROCEDURE — 85652 RBC SED RATE AUTOMATED: CPT

## 2024-04-29 PROCEDURE — 99213 OFFICE O/P EST LOW 20 MIN: CPT | Performed by: STUDENT IN AN ORGANIZED HEALTH CARE EDUCATION/TRAINING PROGRAM

## 2024-04-29 PROCEDURE — 36415 COLL VENOUS BLD VENIPUNCTURE: CPT

## 2024-04-29 PROCEDURE — 71046 X-RAY EXAM CHEST 2 VIEWS: CPT

## 2024-04-29 PROCEDURE — 86140 C-REACTIVE PROTEIN: CPT

## 2024-04-29 PROCEDURE — 73030 X-RAY EXAM OF SHOULDER: CPT

## 2024-04-29 PROCEDURE — 84550 ASSAY OF BLOOD/URIC ACID: CPT

## 2024-04-29 RX ORDER — METHOCARBAMOL 500 MG/1
500 TABLET, FILM COATED ORAL 3 TIMES DAILY PRN
Qty: 21 TABLET | Refills: 0 | Status: SHIPPED | OUTPATIENT
Start: 2024-04-29 | End: 2024-05-06

## 2024-04-29 RX ORDER — NAPROXEN 500 MG/1
500 TABLET ORAL 2 TIMES DAILY WITH MEALS
Qty: 14 TABLET | Refills: 0 | Status: SHIPPED | OUTPATIENT
Start: 2024-04-29 | End: 2024-05-06

## 2024-04-29 NOTE — LETTER
April 29, 2024     Patient: April Casey  YOB: 1970  Date of Visit: 4/29/2024      To Whom it May Concern:    April Casey is under my professional care. April was seen in my office on 4/29/2024. April may return to work on 5/6/24 .    If you have any questions or concerns, please don't hesitate to call.         Sincerely,          Kaley Glez DO        CC: No Recipients

## 2024-04-29 NOTE — PROGRESS NOTES
[facility]  INTERNAL MEDICINE OFFICE VISIT     PATIENT INFORMATION     April Casey   53 y.o. female   MRN: [id number]    ASSESSMENT/PLAN     Diagnoses and all orders for this visit:    Acute pain of right shoulder  Presents with sudden onset right shoulder pain x 1 day.  Pain radiates to hand.  Not associated with numbness, trauma, fever, chills, rash.  Exam notable for right shoulder warm to touch compared to left, tenderness of right trapezius and lateral shoulder and right AC joint, hypertonicity of right trapezius and deltoid, and decreased shoulder ROM due to pain.  Differential includes strain, gout, septic arthritis.    Switch ibuprofen to naproxen 500 mg BID x 7 days  Continue taking Tylenol   Check CRP, sed rate, uric acid  Check CXR and XR right shoulder    -     methocarbamol (ROBAXIN) 500 mg tablet; Take 1 tablet (500 mg total) by mouth 3 (three) times a day as needed for muscle spasms for up to 7 days  -     XR shoulder 2+ vw right; Future  -     naproxen (Naprosyn) 500 mg tablet; Take 1 tablet (500 mg total) by mouth 2 (two) times a day with meals for 7 days  -     Sedimentation rate, automated; Future  -     C-reactive protein; Future  -     Uric acid; Future  -     XR chest pa & lateral; Future        Follow-up on 9/17/24 or sooner if needed.    HEALTH MAINTENANCE     Immunization History   Administered Date(s) Administered    COVID-19 MODERNA VACC 0.5 ML IM 01/13/2021, 02/10/2021, 01/05/2022    Hep B, adult 06/12/2006    Td (adult), adsorbed 05/12/2006    Tdap 07/18/2023     CHIEF COMPLAINT     Chief Complaint   Patient presents with    Shoulder Pain     Right shoulder pain yesterday- pain radiates down arm limited ROM, reports 10/10 pain currently    HM      Colorectal cancer screening      HISTORY OF PRESENT ILLNESS     April Casey is a 53 y.o. female with a history of hypertension, iron deficiency anemia here for right shoulder pain.  Started yesterday while cleaning/dusting.  Localized  to top of shoulder and radiates to hand.  Developed right neck pain and stiffness this morning which limited her range of motion.  Pain exacerbated by shoulder abduction, extension, flexion.  Rated 10/10 in severity.  Applied BenGay, hot and cold packs, took ibuprofen without relief.  No prior similar episodes, numbness, fever, chills, trauma.  Works as a phlebotomist.    REVIEW OF SYSTEMS     Review of Systems   Constitutional:  Negative for appetite change, chills, diaphoresis, fever and unexpected weight change.   Eyes:  Negative for visual disturbance.   Respiratory:  Negative for cough and shortness of breath.    Cardiovascular:  Negative for chest pain, palpitations and leg swelling.   Gastrointestinal:  Negative for abdominal pain, constipation, diarrhea, nausea and vomiting.   Genitourinary:  Negative for difficulty urinating, dysuria and hematuria.   Musculoskeletal:  Positive for arthralgias and neck pain. Negative for back pain.   Skin:  Negative for rash.   Neurological:  Negative for dizziness, weakness, light-headedness, numbness and headaches.     OBJECTIVE     Vitals:    04/29/24 1111   BP: 160/98   BP Location: Left arm   Patient Position: Sitting   Cuff Size: Standard   Pulse: 77   Temp: 98.9 °F (37.2 °C)   TempSrc: Temporal   SpO2: 98%   Weight: 87 kg (191 lb 12.8 oz)     Physical Exam  Vitals reviewed.   Constitutional:       General: She is not in acute distress.  HENT:      Head: Normocephalic and atraumatic.      Nose: No rhinorrhea.   Eyes:      General: No scleral icterus.  Cardiovascular:      Rate and Rhythm: Normal rate and regular rhythm.      Pulses: Normal pulses.      Heart sounds: Normal heart sounds. No murmur heard.  Pulmonary:      Effort: Pulmonary effort is normal. No respiratory distress.      Breath sounds: Normal breath sounds. No wheezing, rhonchi or rales.   Musculoskeletal:      Right shoulder: Tenderness (right trapezius, lateral shoulder) and bony tenderness (right AC  joint, none on clavicle) present. Decreased range of motion (secondary to pain).      Cervical back: No tenderness or bony tenderness.      Thoracic back: No tenderness or bony tenderness.      Lumbar back: No tenderness or bony tenderness.      Comments: Right shoulder warm to touch compared to left, pain exacerbated by rotating head to the left, hypertonic right trapezius and right deltoid   Skin:     General: Skin is warm and dry.      Coloration: Skin is not jaundiced.      Findings: No rash (none seen on bilateral shoulders and upper extremities).   Neurological:      Mental Status: She is alert.      Cranial Nerves: No dysarthria.      Comments: CN 2-12 grossly intact, moves all 4 extremities   Psychiatric:         Mood and Affect: Mood normal.         Behavior: Behavior normal.       CURRENT MEDICATIONS     Current Outpatient Medications:     ferrous sulfate 325 (65 Fe) mg tablet, Take 325 mg by mouth daily with breakfast, Disp: , Rfl:     mometasone (ELOCON) 0.1 % ointment, Apply topically daily, Disp: 45 g, Rfl: 1    valsartan (DIOVAN) 320 MG tablet, Take 1 tablet (320 mg total) by mouth daily, Disp: 90 tablet, Rfl: 3    PAST MEDICAL & SURGICAL HISTORY     Past Medical History:   Diagnosis Date    Acute diffuse otitis externa of left ear 10/24/2019    Anemia     Atopic dermatitis 08/06/2015    Atypical chest pain 04/02/2015    BPPV (benign paroxysmal positional vertigo) 01/05/2017    Chest pain     Class 1 obesity with alveolar hypoventilation without serious comorbidity with body mass index (BMI) of 32.0 to 32.9 in adult (HCC) 07/20/2017    COVID-19 virus infection 10/09/2023    Elevated liver function tests 04/26/2018    Gastroenteritis     Hypertension     Iron deficiency anemia 03/30/2017    Lateral epicondylitis of right elbow 01/11/2018     Past Surgical History:   Procedure Laterality Date    FOOT SURGERY       SOCIAL & FAMILY HISTORY     Social History     Socioeconomic History    Marital status:  /Civil Union     Spouse name: Not on file    Number of children: Not on file    Years of education: Not on file    Highest education level: Not on file   Occupational History    Not on file   Tobacco Use    Smoking status: Never    Smokeless tobacco: Never   Vaping Use    Vaping status: Never Used   Substance and Sexual Activity    Alcohol use: No    Drug use: No    Sexual activity: Not on file   Other Topics Concern    Not on file   Social History Narrative    Advance directive declined by patient     Social Determinants of Health     Financial Resource Strain: Not on file   Food Insecurity: Not on file   Transportation Needs: Not on file   Physical Activity: Not on file   Stress: Not on file   Social Connections: Not on file   Intimate Partner Violence: Not on file   Housing Stability: Not on file     Social History     Substance and Sexual Activity   Alcohol Use No       Social History     Substance and Sexual Activity   Drug Use No     Social History     Tobacco Use   Smoking Status Never   Smokeless Tobacco Never     Family History   Problem Relation Age of Onset    Diabetes Mother         Diabetes Mellitus    Diabetes Other         Diabetes Mellitus    No Known Problems Father     No Known Problems Sister     No Known Problems Daughter     No Known Problems Maternal Grandmother     No Known Problems Maternal Grandfather     No Known Problems Paternal Grandmother     No Known Problems Paternal Grandfather     No Known Problems Sister     No Known Problems Maternal Aunt     No Known Problems Maternal Aunt     No Known Problems Maternal Aunt     No Known Problems Maternal Aunt     No Known Problems Maternal Aunt     No Known Problems Maternal Aunt     No Known Problems Maternal Aunt     No Known Problems Maternal Aunt     No Known Problems Maternal Aunt          ==  Kaley Glez DO  Internal Medicine Resident, PGY-3  [facility] Internal Medicine Residency

## 2024-05-13 ENCOUNTER — VBI (OUTPATIENT)
Dept: ADMINISTRATIVE | Facility: OTHER | Age: 54
End: 2024-05-13

## 2024-05-31 ENCOUNTER — HOSPITAL ENCOUNTER (OUTPATIENT)
Dept: ULTRASOUND IMAGING | Facility: CLINIC | Age: 54
Discharge: HOME | End: 2024-05-31
Payer: COMMERCIAL

## 2024-05-31 ENCOUNTER — APPOINTMENT (OUTPATIENT)
Dept: RADIOLOGY | Age: 54
End: 2024-05-31
Payer: COMMERCIAL

## 2024-05-31 ENCOUNTER — HOSPITAL ENCOUNTER (OUTPATIENT)
Dept: MAMMOGRAPHY | Facility: CLINIC | Age: 54
Discharge: HOME | End: 2024-05-31
Payer: COMMERCIAL

## 2024-05-31 ENCOUNTER — OFFICE VISIT (OUTPATIENT)
Age: 54
End: 2024-05-31
Payer: COMMERCIAL

## 2024-05-31 VITALS
OXYGEN SATURATION: 98 % | BODY MASS INDEX: 33.49 KG/M2 | HEIGHT: 63 IN | SYSTOLIC BLOOD PRESSURE: 130 MMHG | HEART RATE: 78 BPM | TEMPERATURE: 98.3 F | DIASTOLIC BLOOD PRESSURE: 92 MMHG | WEIGHT: 189 LBS

## 2024-05-31 DIAGNOSIS — R92.8 ABNORMAL MAMMOGRAM: ICD-10-CM

## 2024-05-31 DIAGNOSIS — M79.671 RIGHT FOOT PAIN: ICD-10-CM

## 2024-05-31 DIAGNOSIS — M79.671 RIGHT FOOT PAIN: Primary | ICD-10-CM

## 2024-05-31 PROCEDURE — G0279 TOMOSYNTHESIS, MAMMO: HCPCS

## 2024-05-31 PROCEDURE — 73630 X-RAY EXAM OF FOOT: CPT

## 2024-05-31 PROCEDURE — 73610 X-RAY EXAM OF ANKLE: CPT

## 2024-05-31 PROCEDURE — 77065 DX MAMMO INCL CAD UNI: CPT

## 2024-05-31 PROCEDURE — 99213 OFFICE O/P EST LOW 20 MIN: CPT

## 2024-05-31 RX ORDER — NAPROXEN 500 MG/1
500 TABLET ORAL 2 TIMES DAILY WITH MEALS
Qty: 14 TABLET | Refills: 0 | Status: SHIPPED | OUTPATIENT
Start: 2024-05-31 | End: 2024-06-07

## 2024-05-31 NOTE — PROGRESS NOTES
Met with patient and Dr. Torres  regarding recommendation for;    _____ RIGHT ___x___LEFT      ___x__Ultrasound guided  ______Stereotactic breast biopsy.      __X___Verbalized understanding.      Blood thinners:  No: ___x__ Yes: ______ What:                 Biopsy teaching sheet given:  Yes: ___X___ No: ________    Pt given contact information and adv to call with any questions/needs

## 2024-05-31 NOTE — ASSESSMENT & PLAN NOTE
Suspect plantar fasciitis  Will order foot and ankle x-rays as patient has medial malleolus tenderness to palpation to r/o fracture  Advised patient to use foot brace, stretching exercises  Will order naproxen 500 mg twice daily x 7 days  Referral for podiatry per patient request

## 2024-05-31 NOTE — PROGRESS NOTES
Assessment/Plan:    1. Right foot pain  Assessment & Plan:  Suspect plantar fasciitis  Will order foot and ankle x-rays as patient has medial malleolus tenderness to palpation to r/o fracture  Advised patient to use foot brace, stretching exercises  Will order naproxen 500 mg twice daily x 7 days  Referral for podiatry per patient request  Orders:  -     XR foot 2 vw right; Future; Expected date: 05/31/2024  -     XR ankle 3+ vw right; Future; Expected date: 05/31/2024  -     Ambulatory Referral to Podiatry; Future  -     naproxen (Naprosyn) 500 mg tablet; Take 1 tablet (500 mg total) by mouth 2 (two) times a day with meals for 7 days  2. Abnormal mammogram  Comments:  Discussed abnormal mammogram with patient.  She is scheduled for biopsy on 6/18/24            [employer] software was used to dictate this note.  It may contain errors with dictating incorrect words or incorrect spelling. Please contact the provider directly with any questions.    Subjective:      Patient ID: April Casey is a 53 y.o. female.    Patient is a 53-year-old female presenting to the office for right foot pain x 1 month  Notes that the pain is located in the right heel on from the foot, feels pain is also in the anterior shin  Denies injury to the foot   Pain is increased when applying pressure, walking  Also noting burning sensation and that her toes feel numb occasionally    Tx: different shoes, tylenol- not helpful            The following portions of the patient's history were reviewed and updated as appropriate: allergies, current medications, past family history, past medical history, past social history, past surgical history, and problem list.    Review of Systems   Constitutional:  Negative for chills, fatigue and fever.   Respiratory:  Negative for cough, chest tightness, shortness of breath and wheezing.    Cardiovascular:  Negative for chest pain and palpitations.   Musculoskeletal:  Positive for arthralgias and gait problem  (pain with walking). Negative for back pain.   Neurological:  Negative for dizziness, light-headedness, numbness and headaches.         Past Medical History:   Diagnosis Date    Acute diffuse otitis externa of left ear 10/24/2019    Anemia     Atopic dermatitis 08/06/2015    Atypical chest pain 04/02/2015    BPPV (benign paroxysmal positional vertigo) 01/05/2017    Chest pain     Class 1 obesity with alveolar hypoventilation without serious comorbidity with body mass index (BMI) of 32.0 to 32.9 in adult (HCC) 07/20/2017    COVID-19 virus infection 10/09/2023    Elevated liver function tests 04/26/2018    Gastroenteritis     Hypertension     Iron deficiency anemia 03/30/2017    Lateral epicondylitis of right elbow 01/11/2018         Current Outpatient Medications:     ferrous sulfate 325 (65 Fe) mg tablet, Take 325 mg by mouth daily with breakfast, Disp: , Rfl:     mometasone (ELOCON) 0.1 % ointment, Apply topically daily, Disp: 45 g, Rfl: 1    naproxen (Naprosyn) 500 mg tablet, Take 1 tablet (500 mg total) by mouth 2 (two) times a day with meals for 7 days, Disp: 14 tablet, Rfl: 0    valsartan (DIOVAN) 320 MG tablet, Take 1 tablet (320 mg total) by mouth daily, Disp: 90 tablet, Rfl: 3    Allergies   Allergen Reactions    Medrol [Methylprednisolone] Rash    Amoxicillin Rash    Penicillins Rash    Prevpac [Syxlieimz-Xovxkrhwd-Lhfeujetd] Rash       Social History   Past Surgical History:   Procedure Laterality Date    FOOT SURGERY       Family History   Problem Relation Age of Onset    Diabetes Mother         Diabetes Mellitus    Diabetes Other         Diabetes Mellitus    No Known Problems Father     No Known Problems Sister     No Known Problems Daughter     No Known Problems Maternal Grandmother     No Known Problems Maternal Grandfather     No Known Problems Paternal Grandmother     No Known Problems Paternal Grandfather     No Known Problems Sister     No Known Problems Maternal Aunt     No Known Problems Maternal  "Aunt     No Known Problems Maternal Aunt     No Known Problems Maternal Aunt     No Known Problems Maternal Aunt     No Known Problems Maternal Aunt     No Known Problems Maternal Aunt     No Known Problems Maternal Aunt     No Known Problems Maternal Aunt        Objective:  /92 (BP Location: Left arm, Patient Position: Sitting, Cuff Size: Standard)   Pulse 78   Temp 98.3 °F (36.8 °C) (Temporal)   Ht 5' 2.68\" (1.592 m)   Wt 85.7 kg (189 lb)   SpO2 98%   BMI 33.83 kg/m²      Physical Exam  Vitals and nursing note reviewed.   Constitutional:       General: She is not in acute distress.     Appearance: Normal appearance. She is not ill-appearing.   HENT:      Head: Normocephalic and atraumatic.      Right Ear: External ear normal.      Left Ear: External ear normal.      Nose: Nose normal.      Mouth/Throat:      Mouth: Mucous membranes are moist.      Pharynx: Oropharynx is clear.   Eyes:      General: No scleral icterus.     Conjunctiva/sclera: Conjunctivae normal.   Cardiovascular:      Rate and Rhythm: Normal rate.      Pulses:           Dorsalis pedis pulses are 2+ on the right side and 2+ on the left side.        Posterior tibial pulses are 2+ on the right side and 2+ on the left side.   Pulmonary:      Effort: Pulmonary effort is normal. No respiratory distress.   Musculoskeletal:      Right foot: Normal range of motion. Tenderness and bony tenderness present.      Left foot: Normal range of motion.      Comments: Patient noting tenderness plantar side of foot, medial foot posterior heel  She also notes medial malleolus TTP  ROM full.  Patient notes worsening pain with dorsiflexion   Feet:      Right foot:      Skin integrity: Skin integrity normal.      Toenail Condition: Right toenails are normal.      Left foot:      Skin integrity: Skin integrity normal.      Toenail Condition: Left toenails are normal.   Neurological:      General: No focal deficit present.      Mental Status: She is alert and " oriented to person, place, and time. Mental status is at baseline.   Psychiatric:         Mood and Affect: Mood normal.         Behavior: Behavior normal.           Disclaimer: This note was generated with voice recognition software.  Phonetic, grammatical, and spelling errors may be present as a result.  Please contact provider with any concerns or questions

## 2024-06-18 ENCOUNTER — HOSPITAL ENCOUNTER (OUTPATIENT)
Dept: MAMMOGRAPHY | Facility: CLINIC | Age: 54
Discharge: HOME | End: 2024-06-18
Payer: COMMERCIAL

## 2024-06-18 ENCOUNTER — HOSPITAL ENCOUNTER (OUTPATIENT)
Dept: ULTRASOUND IMAGING | Facility: CLINIC | Age: 54
Discharge: HOME | End: 2024-06-18
Payer: COMMERCIAL

## 2024-06-18 VITALS — HEART RATE: 74 BPM | DIASTOLIC BLOOD PRESSURE: 78 MMHG | SYSTOLIC BLOOD PRESSURE: 142 MMHG

## 2024-06-18 DIAGNOSIS — R92.8 ABNORMAL MAMMOGRAM: ICD-10-CM

## 2024-06-18 PROCEDURE — A4648 IMPLANTABLE TISSUE MARKER: HCPCS

## 2024-06-18 PROCEDURE — G0279 TOMOSYNTHESIS, MAMMO: HCPCS

## 2024-06-18 PROCEDURE — 88341 IMHCHEM/IMCYTCHM EA ADD ANTB: CPT | Performed by: STUDENT IN AN ORGANIZED HEALTH CARE EDUCATION/TRAINING PROGRAM

## 2024-06-18 PROCEDURE — 88305 TISSUE EXAM BY PATHOLOGIST: CPT | Performed by: STUDENT IN AN ORGANIZED HEALTH CARE EDUCATION/TRAINING PROGRAM

## 2024-06-18 PROCEDURE — 77065 DX MAMMO INCL CAD UNI: CPT

## 2024-06-18 PROCEDURE — 88342 IMHCHEM/IMCYTCHM 1ST ANTB: CPT | Performed by: STUDENT IN AN ORGANIZED HEALTH CARE EDUCATION/TRAINING PROGRAM

## 2024-06-18 PROCEDURE — 19081 BX BREAST 1ST LESION STRTCTC: CPT

## 2024-06-18 PROCEDURE — 76642 ULTRASOUND BREAST LIMITED: CPT

## 2024-06-18 RX ORDER — LIDOCAINE HYDROCHLORIDE AND EPINEPHRINE BITARTRATE 20; .01 MG/ML; MG/ML
10 INJECTION, SOLUTION SUBCUTANEOUS ONCE
Status: COMPLETED | OUTPATIENT
Start: 2024-06-18 | End: 2024-06-18

## 2024-06-18 RX ORDER — LIDOCAINE HYDROCHLORIDE 10 MG/ML
5 INJECTION, SOLUTION EPIDURAL; INFILTRATION; INTRACAUDAL; PERINEURAL ONCE
Status: COMPLETED | OUTPATIENT
Start: 2024-06-18 | End: 2024-06-18

## 2024-06-18 RX ADMIN — LIDOCAINE HYDROCHLORIDE AND EPINEPHRINE 10 ML: 20; 10 INJECTION, SOLUTION INFILTRATION; PERINEURAL at 14:08

## 2024-06-18 RX ADMIN — LIDOCAINE HYDROCHLORIDE 5 ML: 10 INJECTION, SOLUTION EPIDURAL; INFILTRATION; INTRACAUDAL; PERINEURAL at 14:08

## 2024-06-18 NOTE — PROGRESS NOTES
Procedure type:    _____ultrasound guided __x___stereotactic    Breast:    __x___Left _____Right    Location: 1:00 with calcifications    Needle: 8G    # of passes: 3 all submitted    Clip: Cece    Performed by: Dr Edward    Pressure held for 5 minutes by: Alecia Gong Strips:    __x___yes _____no    Band aid:    ___x__yes_____no    Tape and guaze:    ___x__yes _____no    Tolerated procedure:    ___x__yes _____no

## 2024-06-18 NOTE — PROGRESS NOTES
Ice pack given:    ___x__yes _____no    Discharge instructions reviewed & given to patient:    ___x__yes _____no    Discharged via:    ___x__ambulatory    _____wheelchair    _____stretcher    Stable on discharge:    ___x__yes ____no

## 2024-06-19 NOTE — PROGRESS NOTES
Post procedure call completed    Bleeding: _____yes __X___no    Pain: _____yes ___X___no    Redness/Swelling: ______yes ___X___no    Band aid removed: _____yes ___X__no (discussed removing when she showers)    Steri-Strips intact: ___X___yes _____no (discussed with patient to remove steri strips on  6/23/2024 if they have not come off on their own)    Pt with no questions at this time, adv will call when results available, adv to call with any questions or concerns, has name/# for contact

## 2024-06-21 ENCOUNTER — TELEPHONE (OUTPATIENT)
Dept: MAMMOGRAPHY | Facility: CLINIC | Age: 54
End: 2024-06-21

## 2024-06-21 DIAGNOSIS — N60.92 ATYPICAL DUCTAL HYPERPLASIA OF LEFT BREAST: Primary | ICD-10-CM

## 2024-06-21 PROCEDURE — 88341 IMHCHEM/IMCYTCHM EA ADD ANTB: CPT | Performed by: STUDENT IN AN ORGANIZED HEALTH CARE EDUCATION/TRAINING PROGRAM

## 2024-06-21 PROCEDURE — 88342 IMHCHEM/IMCYTCHM 1ST ANTB: CPT | Performed by: STUDENT IN AN ORGANIZED HEALTH CARE EDUCATION/TRAINING PROGRAM

## 2024-06-21 PROCEDURE — 88305 TISSUE EXAM BY PATHOLOGIST: CPT | Performed by: STUDENT IN AN ORGANIZED HEALTH CARE EDUCATION/TRAINING PROGRAM

## 2024-06-24 ENCOUNTER — TELEPHONE (OUTPATIENT)
Dept: HEMATOLOGY ONCOLOGY | Facility: CLINIC | Age: 54
End: 2024-06-24

## 2024-06-24 NOTE — TELEPHONE ENCOUNTER
I called Candida in response to a referral that was received for patient to establish care with Surgical Oncology.     Outreach was made to schedule a consultation.    I left a voicemail explaining the reason for my call and advised patient to call [facility] at [phone].  Another attempt will be made to contact patient.

## 2024-06-25 ENCOUNTER — DOCUMENTATION (OUTPATIENT)
Dept: HEMATOLOGY ONCOLOGY | Facility: CLINIC | Age: 54
End: 2024-06-25

## 2024-06-25 ENCOUNTER — TELEPHONE (OUTPATIENT)
Age: 54
End: 2024-06-25

## 2024-06-25 ENCOUNTER — TELEPHONE (OUTPATIENT)
Dept: MAMMOGRAPHY | Facility: CLINIC | Age: 54
End: 2024-06-25

## 2024-06-25 NOTE — PROGRESS NOTES
Breast Oncology Nurse Navigator    Received a message from surgical oncology RN.  Patient has ADH diagnosis.  Inadvertently scheduled with an PAOLO.  Needs to be scheduled with a surgeon.  Message sent to PEP to correct the consultation.

## 2024-06-25 NOTE — TELEPHONE ENCOUNTER
Called pt left VM and callback number in regards to new apt date and time which is 7/1/24 @ 1:30pm and 7/30/24 @ 1 pm was cx.    Left callback number if she needed to call back to change the new apt.

## 2024-06-25 NOTE — PROGRESS NOTES
Intake received- chart reviewed for external records retrieval.Patient is scheduled on 7- with  Collette Vasquez for Dx: Atypical ductal hyperplasia of left breast .Due to Dx on patient's referral, no records retrieval needed by Oncology Care Coordination.

## 2024-06-26 ENCOUNTER — TELEPHONE (OUTPATIENT)
Dept: MAMMOGRAPHY | Facility: CLINIC | Age: 54
End: 2024-06-26

## 2024-06-26 ENCOUNTER — TELEPHONE (OUTPATIENT)
Age: 54
End: 2024-06-26

## 2024-06-26 NOTE — TELEPHONE ENCOUNTER
Patient was given breast biopsy results and recommendation.  Patient was referred to breast surgeon Dr. López and has an appointment on 7/1/2024.

## 2024-06-26 NOTE — TELEPHONE ENCOUNTER
Called pt LVM and callback number that 7/1 apt needed to be rescheduled and her new apt was 7/17/24 @ 8am with Dr. López at [facility].     Also stated to callback to confirm she received message.

## 2024-07-17 ENCOUNTER — VBI (OUTPATIENT)
Dept: ADMINISTRATIVE | Facility: OTHER | Age: 54
End: 2024-07-17

## 2024-07-17 ENCOUNTER — APPOINTMENT (OUTPATIENT)
Dept: LAB | Facility: CLINIC | Age: 54
End: 2024-07-17
Payer: COMMERCIAL

## 2024-07-17 ENCOUNTER — OFFICE VISIT (OUTPATIENT)
Dept: SURGICAL ONCOLOGY | Facility: CLINIC | Age: 54
End: 2024-07-17
Payer: COMMERCIAL

## 2024-07-17 VITALS
WEIGHT: 189 LBS | BODY MASS INDEX: 34.78 KG/M2 | DIASTOLIC BLOOD PRESSURE: 94 MMHG | SYSTOLIC BLOOD PRESSURE: 158 MMHG | OXYGEN SATURATION: 97 % | TEMPERATURE: 97.2 F | HEIGHT: 62 IN | HEART RATE: 74 BPM | RESPIRATION RATE: 16 BRPM

## 2024-07-17 DIAGNOSIS — N60.92 ATYPICAL DUCTAL HYPERPLASIA OF LEFT BREAST: ICD-10-CM

## 2024-07-17 DIAGNOSIS — N60.92 ATYPICAL DUCTAL HYPERPLASIA OF LEFT BREAST: Primary | ICD-10-CM

## 2024-07-17 LAB
ALBUMIN SERPL BCG-MCNC: 4.2 G/DL (ref 3.5–5)
ALP SERPL-CCNC: 68 U/L (ref 34–104)
ALT SERPL W P-5'-P-CCNC: 47 U/L (ref 7–52)
ANION GAP SERPL CALCULATED.3IONS-SCNC: 5 MMOL/L (ref 4–13)
AST SERPL W P-5'-P-CCNC: 31 U/L (ref 13–39)
BASOPHILS # BLD AUTO: 0.04 THOUSANDS/ÂΜL (ref 0–0.1)
BASOPHILS NFR BLD AUTO: 0 % (ref 0–1)
BILIRUB SERPL-MCNC: 0.36 MG/DL (ref 0.2–1)
BUN SERPL-MCNC: 14 MG/DL (ref 5–25)
CALCIUM SERPL-MCNC: 9 MG/DL (ref 8.4–10.2)
CHLORIDE SERPL-SCNC: 104 MMOL/L (ref 96–108)
CO2 SERPL-SCNC: 27 MMOL/L (ref 21–32)
CREAT SERPL-MCNC: 0.66 MG/DL (ref 0.6–1.3)
EOSINOPHIL # BLD AUTO: 0.25 THOUSAND/ÂΜL (ref 0–0.61)
EOSINOPHIL NFR BLD AUTO: 3 % (ref 0–6)
ERYTHROCYTE [DISTWIDTH] IN BLOOD BY AUTOMATED COUNT: 13 % (ref 11.6–15.1)
GFR SERPL CREATININE-BSD FRML MDRD: 100 ML/MIN/1.73SQ M
GLUCOSE P FAST SERPL-MCNC: 107 MG/DL (ref 65–99)
HCT VFR BLD AUTO: 38.7 % (ref 34.8–46.1)
HGB BLD-MCNC: 12.3 G/DL (ref 11.5–15.4)
IMM GRANULOCYTES # BLD AUTO: 0.03 THOUSAND/UL (ref 0–0.2)
IMM GRANULOCYTES NFR BLD AUTO: 0 % (ref 0–2)
LYMPHOCYTES # BLD AUTO: 2.91 THOUSANDS/ÂΜL (ref 0.6–4.47)
LYMPHOCYTES NFR BLD AUTO: 31 % (ref 14–44)
MCH RBC QN AUTO: 28.3 PG (ref 26.8–34.3)
MCHC RBC AUTO-ENTMCNC: 31.8 G/DL (ref 31.4–37.4)
MCV RBC AUTO: 89 FL (ref 82–98)
MONOCYTES # BLD AUTO: 0.56 THOUSAND/ÂΜL (ref 0.17–1.22)
MONOCYTES NFR BLD AUTO: 6 % (ref 4–12)
NEUTROPHILS # BLD AUTO: 5.51 THOUSANDS/ÂΜL (ref 1.85–7.62)
NEUTS SEG NFR BLD AUTO: 60 % (ref 43–75)
NRBC BLD AUTO-RTO: 0 /100 WBCS
PLATELET # BLD AUTO: 211 THOUSANDS/UL (ref 149–390)
PMV BLD AUTO: 12.8 FL (ref 8.9–12.7)
POTASSIUM SERPL-SCNC: 4.1 MMOL/L (ref 3.5–5.3)
PROT SERPL-MCNC: 7.5 G/DL (ref 6.4–8.4)
RBC # BLD AUTO: 4.35 MILLION/UL (ref 3.81–5.12)
SODIUM SERPL-SCNC: 136 MMOL/L (ref 135–147)
WBC # BLD AUTO: 9.3 THOUSAND/UL (ref 4.31–10.16)

## 2024-07-17 PROCEDURE — 85025 COMPLETE CBC W/AUTO DIFF WBC: CPT | Performed by: SURGERY

## 2024-07-17 PROCEDURE — 36415 COLL VENOUS BLD VENIPUNCTURE: CPT | Performed by: SURGERY

## 2024-07-17 PROCEDURE — 80053 COMPREHEN METABOLIC PANEL: CPT | Performed by: SURGERY

## 2024-07-17 PROCEDURE — 93005 ELECTROCARDIOGRAM TRACING: CPT

## 2024-07-17 PROCEDURE — 99244 OFF/OP CNSLTJ NEW/EST MOD 40: CPT | Performed by: SURGERY

## 2024-07-17 RX ORDER — OXYCODONE HYDROCHLORIDE AND ACETAMINOPHEN 5; 325 MG/1; MG/1
1 TABLET ORAL EVERY 6 HOURS PRN
Qty: 6 TABLET | Refills: 0 | Status: SHIPPED | OUTPATIENT
Start: 2024-07-17

## 2024-07-17 NOTE — TELEPHONE ENCOUNTER
07/17/24 7:35 AM     Chart reviewed for CRC: Colonoscopy ; nothing is submitted to the patient's insurance at this time.     RODOLFO OLIVA [facility]

## 2024-07-17 NOTE — H&P (VIEW-ONLY)
Surgical Oncology Consult Note       [facility] [place]  [address]  [facility] [address]    April Casey  1970  [id number]  [facility] [place]  [address]  [facility] [address]      Chief Complaint:     Chief Complaint   Patient presents with    Consult     Left breast ADH       Assessment and Plan:   Assessment & Plan   The patient presents with a new diagnosis of left breast atypical ductal hyperplasia.  She has calcifications covering an area of 3.7 cm.  Recommended a JOHAN placement and subsequent Johan/needle localization left breast lumpectomy to exclude a malignancy.  I reviewed this with the patient.  We went through the process of informed consent.    Oncology History:     Oncology History    No history exists.       History of Present Illness:   This is a 54-year-old woman who has no family history of breast cancer and now presents with a new focus of atypical ductal hyperplasia in the left breast.  She originally went for a screening mammogram back in April which demonstrated no abnormalities on the right side however on the left side there were calcifications in the upper outer quadrant.  She returned for a diagnostic mammogram which evaluated the calcifications covering an area of approximately 3.7 cm.  She had an ultrasound and ultimately a stereotactic biopsy which demonstrated atypical ductal hyperplasia.  She presents now with her daughter for an opinion regarding further management.    Review of Systems:   Review of Systems   Constitutional:  Negative for chills and fever.   HENT:  Negative for ear pain and sore throat.    Eyes:  Negative for pain and visual disturbance.   Respiratory:  Negative for cough and shortness of breath.    Cardiovascular:  Negative for chest pain and palpitations.   Gastrointestinal:  Negative for abdominal pain and vomiting.    Genitourinary:  Negative for dysuria and hematuria.   Musculoskeletal:  Negative for arthralgias and back pain.   Skin:  Negative for color change and rash.   Neurological:  Negative for seizures and syncope.   All other systems reviewed and are negative.      Past Medical History:      Patient Active Problem List   Diagnosis    Sleep disorder    Iron deficiency anemia    Class 1 obesity with alveolar hypoventilation without serious comorbidity with body mass index (BMI) of 32.0 to 32.9 in adult ([facility])    Bilateral carpal tunnel syndrome    Benign essential hypertension    Back pain, lumbosacral    Leg paresthesia    Onychomycosis of left great toe    Vision disturbance    Atopic neurodermatitis    Pes anserine bursitis    Lymphadenopathy of right cervical region    Plantar fasciitis of left foot    Tension headache    Localized swelling of finger of left hand    Infection of thumb    Acute viral syndrome    Right foot pain        Past Medical History:   Diagnosis Date    Acute diffuse otitis externa of left ear 10/24/2019    Anemia     Atopic dermatitis 08/06/2015    Atypical chest pain 04/02/2015    BPPV (benign paroxysmal positional vertigo) 01/05/2017    Chest pain     Class 1 obesity with alveolar hypoventilation without serious comorbidity with body mass index (BMI) of 32.0 to 32.9 in adult ([facility]) 07/20/2017    COVID-19 virus infection 10/09/2023    Elevated liver function tests 04/26/2018    Gastroenteritis     Hypertension     Iron deficiency anemia 03/30/2017    Lateral epicondylitis of right elbow 01/11/2018        Past Surgical History:   Procedure Laterality Date    FOOT SURGERY      MAMMO STEREOTACTIC BREAST BIOPSY LEFT (ALL INC) Left 6/18/2024        Family History   Problem Relation Age of Onset    Diabetes Mother         Diabetes Mellitus    No Known Problems Father     No Known Problems Sister     No Known Problems Sister     No Known Problems Daughter     No Known Problems Maternal Aunt     No  Known Problems Maternal Aunt     No Known Problems Maternal Aunt     No Known Problems Maternal Aunt     No Known Problems Maternal Aunt     No Known Problems Maternal Aunt     No Known Problems Maternal Aunt     No Known Problems Maternal Aunt     No Known Problems Maternal Aunt     No Known Problems Maternal Grandmother     No Known Problems Maternal Grandfather     No Known Problems Paternal Grandmother     No Known Problems Paternal Grandfather     Diabetes Other         Diabetes Mellitus        Social History     Socioeconomic History    Marital status: /Civil Union     Spouse name: Not on file    Number of children: Not on file    Years of education: Not on file    Highest education level: Not on file   Occupational History    Not on file   Tobacco Use    Smoking status: Never    Smokeless tobacco: Never   Vaping Use    Vaping status: Never Used   Substance and Sexual Activity    Alcohol use: No    Drug use: No    Sexual activity: Not on file   Other Topics Concern    Not on file   Social History Narrative    Advance directive declined by patient     Social Determinants of Health     Financial Resource Strain: Not on file   Food Insecurity: Not on file   Transportation Needs: Not on file   Physical Activity: Not on file   Stress: Not on file   Social Connections: Not on file   Intimate Partner Violence: Not on file   Housing Stability: Not on file        Current Outpatient Medications:     ferrous sulfate 325 (65 Fe) mg tablet, Take 325 mg by mouth daily with breakfast, Disp: , Rfl:     mometasone (ELOCON) 0.1 % ointment, Apply topically daily, Disp: 45 g, Rfl: 1    naproxen (Naprosyn) 500 mg tablet, Take 1 tablet (500 mg total) by mouth 2 (two) times a day with meals for 7 days, Disp: 14 tablet, Rfl: 0    valsartan (DIOVAN) 320 MG tablet, Take 1 tablet (320 mg total) by mouth daily, Disp: 90 tablet, Rfl: 3     Allergies   Allergen Reactions    Medrol [Methylprednisolone] Rash    Amoxicillin Rash     Penicillins Rash    Prevpac [Mkmklrzfl-Xauamyeob-Xibdtwshc] Rash       Physical Exam:     Vitals:    07/17/24 0801   BP: 158/94   Pulse: 74   Resp: 16   Temp: (!) 97.2 °F (36.2 °C)   SpO2: 97%     Physical Exam  Vitals reviewed.   Constitutional:       Appearance: She is well-developed.   HENT:      Head: Normocephalic and atraumatic.   Eyes:      Pupils: Pupils are equal, round, and reactive to light.   Neck:      Thyroid: No thyromegaly.      Vascular: No JVD.      Trachea: No tracheal deviation.   Cardiovascular:      Rate and Rhythm: Normal rate and regular rhythm.      Heart sounds: Normal heart sounds. No murmur heard.     No friction rub. No gallop.   Pulmonary:      Effort: Pulmonary effort is normal. No respiratory distress.      Breath sounds: Normal breath sounds. No wheezing or rales.   Chest:      Comments: Both breasts were examined in the sitting and supine position. There are no worrisome skin lesions, no nipple retraction and no nipple discharge. There are no dominant masses, axillary adenopathy or supraclavicular adenopathy on either side.  Abdominal:      General: There is no distension.      Palpations: Abdomen is soft. There is no hepatomegaly or mass.      Tenderness: There is no abdominal tenderness. There is no guarding or rebound.   Musculoskeletal:         General: No tenderness. Normal range of motion.      Cervical back: Normal range of motion and neck supple.   Lymphadenopathy:      Cervical: No cervical adenopathy.   Skin:     General: Skin is warm and dry.      Findings: No erythema or rash.   Neurological:      Mental Status: She is alert and oriented to person, place, and time.      Cranial Nerves: No cranial nerve deficit.   Psychiatric:         Behavior: Behavior normal.         Results:   I reviewed her mammogram as well as her calcifications they are somewhat extensive.  I reviewed this with Dr. Edward and we agreed to move forward with a bracketed lumpectomy using a wire for  the current biopsy site as well as a Corrina  clip to be placed.    Discussion/Summary:   I reviewed the nature of atypical ductal and lobular hyperplasia with the patient.  I explained that the majority of time this is not cancer however we would like to remove the area that is concerning to make sure there is not a early cancer that was missed by the biopsy.  Since she has calcifications covering area of approximately 4 cm I did explain that this might cause some dimpling but we needed to remove that area to make sure that there is no evidence of cancer in that region.  The patient is agreeable to this approach.  I explained the Corrina / needle localization process.    The patient and I underwent the process of consent for left breast bracketed CORRINA /needle localization lumpectomy.  The complications outlined on the consent including relatively minor problems (wound infection, wound healing problems, hematomas, scarring, chronic discomfort/pain), moderate problems (injury to nerves or blood vessels, allergic reactions) and major complications (extensive blood loss requiring transfusions or possible addtional surgeries, cardiac arrest, stroke and possible death).  We also reviewed specific complications as outlined on the consent form including  possible need for additional treatments and/or surgery, possible wire migration.  All questions were answered to the patient's satisfaction.  We will coordinate the surgery at our next mutual convience.    Advance Care Planning/Advance Directives:  I discussed the disease status, treatment plans and follow-up with the patient.

## 2024-07-17 NOTE — PROGRESS NOTES
Surgical Oncology Consult Note       [facility] [place]  [address]  [facility] [address]    April Casey  1970  [id number]  [facility] [place]  [address]  [facility] [address]      Chief Complaint:     Chief Complaint   Patient presents with    Consult     Left breast ADH       Assessment and Plan:   Assessment & Plan   The patient presents with a new diagnosis of left breast atypical ductal hyperplasia.  She has calcifications covering an area of 3.7 cm.  Recommended a JOHAN placement and subsequent Johan/needle localization left breast lumpectomy to exclude a malignancy.  I reviewed this with the patient.  We went through the process of informed consent.    Oncology History:     Oncology History    No history exists.       History of Present Illness:   This is a 54-year-old woman who has no family history of breast cancer and now presents with a new focus of atypical ductal hyperplasia in the left breast.  She originally went for a screening mammogram back in April which demonstrated no abnormalities on the right side however on the left side there were calcifications in the upper outer quadrant.  She returned for a diagnostic mammogram which evaluated the calcifications covering an area of approximately 3.7 cm.  She had an ultrasound and ultimately a stereotactic biopsy which demonstrated atypical ductal hyperplasia.  She presents now with her daughter for an opinion regarding further management.    Review of Systems:   Review of Systems   Constitutional:  Negative for chills and fever.   HENT:  Negative for ear pain and sore throat.    Eyes:  Negative for pain and visual disturbance.   Respiratory:  Negative for cough and shortness of breath.    Cardiovascular:  Negative for chest pain and palpitations.   Gastrointestinal:  Negative for abdominal pain and vomiting.    Genitourinary:  Negative for dysuria and hematuria.   Musculoskeletal:  Negative for arthralgias and back pain.   Skin:  Negative for color change and rash.   Neurological:  Negative for seizures and syncope.   All other systems reviewed and are negative.      Past Medical History:      Patient Active Problem List   Diagnosis    Sleep disorder    Iron deficiency anemia    Class 1 obesity with alveolar hypoventilation without serious comorbidity with body mass index (BMI) of 32.0 to 32.9 in adult ([facility])    Bilateral carpal tunnel syndrome    Benign essential hypertension    Back pain, lumbosacral    Leg paresthesia    Onychomycosis of left great toe    Vision disturbance    Atopic neurodermatitis    Pes anserine bursitis    Lymphadenopathy of right cervical region    Plantar fasciitis of left foot    Tension headache    Localized swelling of finger of left hand    Infection of thumb    Acute viral syndrome    Right foot pain        Past Medical History:   Diagnosis Date    Acute diffuse otitis externa of left ear 10/24/2019    Anemia     Atopic dermatitis 08/06/2015    Atypical chest pain 04/02/2015    BPPV (benign paroxysmal positional vertigo) 01/05/2017    Chest pain     Class 1 obesity with alveolar hypoventilation without serious comorbidity with body mass index (BMI) of 32.0 to 32.9 in adult ([facility]) 07/20/2017    COVID-19 virus infection 10/09/2023    Elevated liver function tests 04/26/2018    Gastroenteritis     Hypertension     Iron deficiency anemia 03/30/2017    Lateral epicondylitis of right elbow 01/11/2018        Past Surgical History:   Procedure Laterality Date    FOOT SURGERY      MAMMO STEREOTACTIC BREAST BIOPSY LEFT ([facility] INC) Left 6/18/2024        Family History   Problem Relation Age of Onset    Diabetes Mother         Diabetes Mellitus    No Known Problems Father     No Known Problems Sister     No Known Problems Sister     No Known Problems Daughter     No Known Problems Maternal Aunt     No  Known Problems Maternal Aunt     No Known Problems Maternal Aunt     No Known Problems Maternal Aunt     No Known Problems Maternal Aunt     No Known Problems Maternal Aunt     No Known Problems Maternal Aunt     No Known Problems Maternal Aunt     No Known Problems Maternal Aunt     No Known Problems Maternal Grandmother     No Known Problems Maternal Grandfather     No Known Problems Paternal Grandmother     No Known Problems Paternal Grandfather     Diabetes Other         Diabetes Mellitus        Social History     Socioeconomic History    Marital status: /Civil Union     Spouse name: Not on file    Number of children: Not on file    Years of education: Not on file    Highest education level: Not on file   Occupational History    Not on file   Tobacco Use    Smoking status: Never    Smokeless tobacco: Never   Vaping Use    Vaping status: Never Used   Substance and Sexual Activity    Alcohol use: No    Drug use: No    Sexual activity: Not on file   Other Topics Concern    Not on file   Social History Narrative    Advance directive declined by patient     Social Determinants of Health     Financial Resource Strain: Not on file   Food Insecurity: Not on file   Transportation Needs: Not on file   Physical Activity: Not on file   Stress: Not on file   Social Connections: Not on file   Intimate Partner Violence: Not on file   Housing Stability: Not on file        Current Outpatient Medications:     ferrous sulfate 325 (65 Fe) mg tablet, Take 325 mg by mouth daily with breakfast, Disp: , Rfl:     mometasone (ELOCON) 0.1 % ointment, Apply topically daily, Disp: 45 g, Rfl: 1    naproxen (Naprosyn) 500 mg tablet, Take 1 tablet (500 mg total) by mouth 2 (two) times a day with meals for 7 days, Disp: 14 tablet, Rfl: 0    valsartan (DIOVAN) 320 MG tablet, Take 1 tablet (320 mg total) by mouth daily, Disp: 90 tablet, Rfl: 3     Allergies   Allergen Reactions    Medrol [Methylprednisolone] Rash    Amoxicillin Rash     Penicillins Rash    Prevpac [Ftjzmpicq-Lqhmmvxwq-Irwbuzdkf] Rash       Physical Exam:     Vitals:    07/17/24 0801   BP: 158/94   Pulse: 74   Resp: 16   Temp: (!) 97.2 °F (36.2 °C)   SpO2: 97%     Physical Exam  Vitals reviewed.   Constitutional:       Appearance: She is well-developed.   HENT:      Head: Normocephalic and atraumatic.   Eyes:      Pupils: Pupils are equal, round, and reactive to light.   Neck:      Thyroid: No thyromegaly.      Vascular: No JVD.      Trachea: No tracheal deviation.   Cardiovascular:      Rate and Rhythm: Normal rate and regular rhythm.      Heart sounds: Normal heart sounds. No murmur heard.     No friction rub. No gallop.   Pulmonary:      Effort: Pulmonary effort is normal. No respiratory distress.      Breath sounds: Normal breath sounds. No wheezing or rales.   Chest:      Comments: Both breasts were examined in the sitting and supine position. There are no worrisome skin lesions, no nipple retraction and no nipple discharge. There are no dominant masses, axillary adenopathy or supraclavicular adenopathy on either side.  Abdominal:      General: There is no distension.      Palpations: Abdomen is soft. There is no hepatomegaly or mass.      Tenderness: There is no abdominal tenderness. There is no guarding or rebound.   Musculoskeletal:         General: No tenderness. Normal range of motion.      Cervical back: Normal range of motion and neck supple.   Lymphadenopathy:      Cervical: No cervical adenopathy.   Skin:     General: Skin is warm and dry.      Findings: No erythema or rash.   Neurological:      Mental Status: She is alert and oriented to person, place, and time.      Cranial Nerves: No cranial nerve deficit.   Psychiatric:         Behavior: Behavior normal.         Results:   I reviewed her mammogram as well as her calcifications they are somewhat extensive.  I reviewed this with Dr. Edward and we agreed to move forward with a bracketed lumpectomy using a wire for  the current biopsy site as well as a Corrina  clip to be placed.    Discussion/Summary:   I reviewed the nature of atypical ductal and lobular hyperplasia with the patient.  I explained that the majority of time this is not cancer however we would like to remove the area that is concerning to make sure there is not a early cancer that was missed by the biopsy.  Since she has calcifications covering area of approximately 4 cm I did explain that this might cause some dimpling but we needed to remove that area to make sure that there is no evidence of cancer in that region.  The patient is agreeable to this approach.  I explained the Corrina / needle localization process.    The patient and I underwent the process of consent for left breast bracketed CORRINA /needle localization lumpectomy.  The complications outlined on the consent including relatively minor problems (wound infection, wound healing problems, hematomas, scarring, chronic discomfort/pain), moderate problems (injury to nerves or blood vessels, allergic reactions) and major complications (extensive blood loss requiring transfusions or possible addtional surgeries, cardiac arrest, stroke and possible death).  We also reviewed specific complications as outlined on the consent form including  possible need for additional treatments and/or surgery, possible wire migration.  All questions were answered to the patient's satisfaction.  We will coordinate the surgery at our next mutual convience.    Advance Care Planning/Advance Directives:  I discussed the disease status, treatment plans and follow-up with the patient.

## 2024-07-18 NOTE — PROGRESS NOTES
Call placed to patient regarding recommendation for;    _____ RIGHT ___x___LEFT      __X___SAVI  placement.    Procedure explained to patient, additional questions answered at this time    __X___Verbalized understanding.      Blood thinners:  _____yes __X___no        All teaching points discussed during call    Pt given name/# for any further questions/needs

## 2024-07-19 DIAGNOSIS — R89.7 ABNORMAL BREAST BIOPSY: Primary | ICD-10-CM

## 2024-07-21 LAB
ATRIAL RATE: 59 BPM
P AXIS: 41 DEGREES
PR INTERVAL: 146 MS
QRS AXIS: 1 DEGREES
QRSD INTERVAL: 98 MS
QT INTERVAL: 398 MS
QTC INTERVAL: 394 MS
T WAVE AXIS: 6 DEGREES
VENTRICULAR RATE: 59 BPM

## 2024-07-21 PROCEDURE — 93010 ELECTROCARDIOGRAM REPORT: CPT | Performed by: INTERNAL MEDICINE

## 2024-07-29 ENCOUNTER — VBI (OUTPATIENT)
Dept: ADMINISTRATIVE | Facility: OTHER | Age: 54
End: 2024-07-29

## 2024-07-29 NOTE — TELEPHONE ENCOUNTER
07/29/24 3:15 PM     Chart reviewed for Pap Smear (HPV) aka Cervical Cancer Screening ; nothing is submitted to the patient's insurance at this time.     Jillian Dean   [facility]

## 2024-08-02 ENCOUNTER — HOSPITAL ENCOUNTER (OUTPATIENT)
Dept: MAMMOGRAPHY | Facility: CLINIC | Age: 54
Discharge: HOME | End: 2024-08-02
Payer: COMMERCIAL

## 2024-08-02 VITALS — HEART RATE: 76 BPM | DIASTOLIC BLOOD PRESSURE: 89 MMHG | SYSTOLIC BLOOD PRESSURE: 149 MMHG

## 2024-08-02 DIAGNOSIS — N60.92 ATYPICAL DUCTAL HYPERPLASIA OF LEFT BREAST: ICD-10-CM

## 2024-08-02 PROCEDURE — 19281 PERQ DEVICE BREAST 1ST IMAG: CPT

## 2024-08-02 RX ORDER — LIDOCAINE HYDROCHLORIDE 10 MG/ML
5 INJECTION, SOLUTION EPIDURAL; INFILTRATION; INTRACAUDAL; PERINEURAL ONCE
Status: COMPLETED | OUTPATIENT
Start: 2024-08-02 | End: 2024-08-02

## 2024-08-02 RX ADMIN — LIDOCAINE HYDROCHLORIDE 5 ML: 10 INJECTION, SOLUTION EPIDURAL; INFILTRATION; INTRACAUDAL; PERINEURAL at 13:47

## 2024-08-02 NOTE — PROGRESS NOTES
Procedure type: Clip Placement    _____ultrasound guided __x___Mammo Guided    Breast:    ___x__Left _____Right    Location:  1 o'clock    Needle: 16G     # of passes: 1    Clip: Corrina    Performed by:Dr. Milligan    Pressure held for 5 minutes by: Greta Gong Strips:    ____yes ___x__no    Band aid:    __X___yes_____no    Tolerated procedure:    __X___yes _____no

## 2024-08-02 NOTE — LETTER
Critical access hospital [facility] [place]  [address] [facility] [address]  Dept: [phone]    August 2, 2024     Patient: April Casey   YOB: 1970   Date of Visit: 8/2/2024       To Whom it May Concern:    April Casey is under my professional care. She was seen in the hospital on 8/2/2024 .    If you have any questions or concerns, please don't hesitate to call.         Sincerely,          JO WARREN MA

## 2024-08-05 NOTE — PROGRESS NOTES
Post procedure call completed    Bleeding: _____yes __X___no    Pain: _____yes ___X___no    Redness/Swelling: ______yes ___X___no    Band aid removed: ___x__yes _____no

## 2024-08-07 RX ORDER — MELOXICAM 15 MG/1
15 TABLET ORAL DAILY
COMMUNITY
Start: 2024-07-29

## 2024-08-07 NOTE — PRE-PROCEDURE INSTRUCTIONS
Pre-Surgery Instructions:   Medication Instructions    ferrous sulfate 325 (65 Fe) mg tablet Hold day of surgery.    mometasone (ELOCON) 0.1 % ointment Hold day of surgery.    naproxen (Naprosyn) 500 mg tablet Stop taking 3 days prior to surgery.    valsartan (DIOVAN) 320 MG tablet Hold day of surgery.   Medication instructions for day surgery reviewed. Please use only a sip of water to take your instructed medications. Avoid all over the counter vitamins, supplements and NSAIDS for one week prior to surgery per anesthesia guidelines. Tylenol is ok to take as needed.     You will receive a call one business day prior to surgery with an arrival time and hospital directions. If your surgery is scheduled on a Monday, the hospital will be calling you on the Friday prior to your surgery. If you have not heard from anyone by 8pm, please call the hospital supervisor through the hospital  at [phone]. ([place] [phone] or [place] [phone]).    Do not eat or drink anything after midnight the night before your surgery, including candy, mints, lifesavers, or chewing gum. Do not drink alcohol 24hrs before your surgery. Try not to smoke at least 24hrs before your surgery.       Follow the pre surgery showering instructions as listed in the “My Surgical Experience Booklet” or otherwise provided by your surgeon's office. Do not use a blade to shave the surgical area 1 week before surgery. It is okay to use a clean electric clippers up to 24 hours before surgery. Do not apply any lotions, creams, including makeup, cologne, deodorant, or perfumes after showering on the day of your surgery. Do not use dry shampoo, hair spray, hair gel, or any type of hair products.     No contact lenses, eye make-up, or artificial eyelashes. Remove nail polish, including gel polish, and any artificial, gel, or acrylic nails if possible. Remove all jewelry including rings and body piercing jewelry.     Wear causal clothing that  is easy to take on and off. Consider your type of surgery.    Keep any valuables, jewelry, piercings at home. Please bring any specially ordered equipment (sling, braces) if indicated.    Arrange for a responsible person to drive you to and from the hospital on the day of your surgery. Please confirm the visitor policy for the day of your procedure when you receive your phone call with an arrival time.     Call the surgeon's office with any new illnesses, exposures, or additional questions prior to surgery.    Please reference your “My Surgical Experience Booklet” for additional information to prepare for your upcoming surgery.

## 2024-08-07 NOTE — PRE-PROCEDURE INSTRUCTIONS
Pre-Surgery Instructions:   Medication Instructions    ferrous sulfate 325 (65 Fe) mg tablet Hold day of surgery.    meloxicam (MOBIC) 15 mg tablet Stop taking 3 days prior to surgery.    mometasone (ELOCON) 0.1 % ointment Hold day of surgery.    naproxen (Naprosyn) 500 mg tablet Stop taking 3 days prior to surgery.    valsartan (DIOVAN) 320 MG tablet Hold day of surgery.   Medication instructions for day surgery reviewed. Please use only a sip of water to take your instructed medications. Avoid all over the counter vitamins, supplements and NSAIDS for one week prior to surgery per anesthesia guidelines. Tylenol is ok to take as needed.     You will receive a call one business day prior to surgery with an arrival time and hospital directions. If your surgery is scheduled on a Monday, the hospital will be calling you on the Friday prior to your surgery. If you have not heard from anyone by 8pm, please call the hospital supervisor through the hospital  at [phone]. ([place] [phone] or [place] [phone]).    Do not eat or drink anything after midnight the night before your surgery, including candy, mints, lifesavers, or chewing gum. Do not drink alcohol 24hrs before your surgery. Try not to smoke at least 24hrs before your surgery.       Follow the pre surgery showering instructions as listed in the “My Surgical Experience Booklet” or otherwise provided by your surgeon's office. Do not use a blade to shave the surgical area 1 week before surgery. It is okay to use a clean electric clippers up to 24 hours before surgery. Do not apply any lotions, creams, including makeup, cologne, deodorant, or perfumes after showering on the day of your surgery. Do not use dry shampoo, hair spray, hair gel, or any type of hair products.     No contact lenses, eye make-up, or artificial eyelashes. Remove nail polish, including gel polish, and any artificial, gel, or acrylic nails if possible. Remove all jewelry  including rings and body piercing jewelry.     Wear causal clothing that is easy to take on and off. Consider your type of surgery.    Keep any valuables, jewelry, piercings at home. Please bring any specially ordered equipment (sling, braces) if indicated.    Arrange for a responsible person to drive you to and from the hospital on the day of your surgery. Please confirm the visitor policy for the day of your procedure when you receive your phone call with an arrival time.     Call the surgeon's office with any new illnesses, exposures, or additional questions prior to surgery.    Please reference your “My Surgical Experience Booklet” for additional information to prepare for your upcoming surgery.

## 2024-08-15 ENCOUNTER — ANESTHESIA (OUTPATIENT)
Dept: PERIOP | Facility: HOSPITAL | Age: 54
End: 2024-08-15
Payer: COMMERCIAL

## 2024-08-15 ENCOUNTER — HOSPITAL ENCOUNTER (OUTPATIENT)
Facility: HOSPITAL | Age: 54
Setting detail: OUTPATIENT SURGERY
Discharge: HOME | End: 2024-08-15
Attending: SURGERY | Admitting: SURGERY
Payer: COMMERCIAL

## 2024-08-15 ENCOUNTER — APPOINTMENT (OUTPATIENT)
Dept: RADIOLOGY | Facility: HOSPITAL | Age: 54
End: 2024-08-15
Payer: COMMERCIAL

## 2024-08-15 ENCOUNTER — HOSPITAL ENCOUNTER (OUTPATIENT)
Dept: MAMMOGRAPHY | Facility: HOSPITAL | Age: 54
Discharge: HOME | End: 2024-08-15
Attending: SURGERY
Payer: COMMERCIAL

## 2024-08-15 ENCOUNTER — ANESTHESIA EVENT (OUTPATIENT)
Dept: PERIOP | Facility: HOSPITAL | Age: 54
End: 2024-08-15
Payer: COMMERCIAL

## 2024-08-15 VITALS
TEMPERATURE: 97.2 F | HEART RATE: 68 BPM | OXYGEN SATURATION: 97 % | DIASTOLIC BLOOD PRESSURE: 74 MMHG | SYSTOLIC BLOOD PRESSURE: 166 MMHG | RESPIRATION RATE: 18 BRPM

## 2024-08-15 DIAGNOSIS — N60.92 ATYPICAL DUCTAL HYPERPLASIA OF LEFT BREAST: ICD-10-CM

## 2024-08-15 DIAGNOSIS — N60.92 BENIGN MAMMARY DYSPLASIA OF LEFT BREAST: ICD-10-CM

## 2024-08-15 PROCEDURE — 19281 PERQ DEVICE BREAST 1ST IMAG: CPT

## 2024-08-15 PROCEDURE — 76098 X-RAY EXAM SURGICAL SPECIMEN: CPT | Performed by: SURGERY

## 2024-08-15 PROCEDURE — 88307 TISSUE EXAM BY PATHOLOGIST: CPT | Performed by: STUDENT IN AN ORGANIZED HEALTH CARE EDUCATION/TRAINING PROGRAM

## 2024-08-15 PROCEDURE — 19301 PARTIAL MASTECTOMY: CPT | Performed by: SURGERY

## 2024-08-15 PROCEDURE — 88341 IMHCHEM/IMCYTCHM EA ADD ANTB: CPT | Performed by: STUDENT IN AN ORGANIZED HEALTH CARE EDUCATION/TRAINING PROGRAM

## 2024-08-15 PROCEDURE — 88342 IMHCHEM/IMCYTCHM 1ST ANTB: CPT | Performed by: STUDENT IN AN ORGANIZED HEALTH CARE EDUCATION/TRAINING PROGRAM

## 2024-08-15 RX ORDER — LIDOCAINE HYDROCHLORIDE 10 MG/ML
5 INJECTION, SOLUTION EPIDURAL; INFILTRATION; INTRACAUDAL; PERINEURAL ONCE
Status: COMPLETED | OUTPATIENT
Start: 2024-08-15 | End: 2024-08-15

## 2024-08-15 RX ORDER — HEPARIN SODIUM 5000 [USP'U]/ML
5000 INJECTION, SOLUTION INTRAVENOUS; SUBCUTANEOUS EVERY 8 HOURS SCHEDULED
Status: DISCONTINUED | OUTPATIENT
Start: 2024-08-15 | End: 2024-08-15

## 2024-08-15 RX ORDER — GLYCOPYRROLATE 0.2 MG/ML
INJECTION INTRAMUSCULAR; INTRAVENOUS AS NEEDED
Status: DISCONTINUED | OUTPATIENT
Start: 2024-08-15 | End: 2024-08-15

## 2024-08-15 RX ORDER — LIDOCAINE HYDROCHLORIDE 10 MG/ML
INJECTION, SOLUTION EPIDURAL; INFILTRATION; INTRACAUDAL; PERINEURAL AS NEEDED
Status: DISCONTINUED | OUTPATIENT
Start: 2024-08-15 | End: 2024-08-15

## 2024-08-15 RX ORDER — OXYCODONE HYDROCHLORIDE 5 MG/1
10 TABLET ORAL EVERY 4 HOURS PRN
Status: DISCONTINUED | OUTPATIENT
Start: 2024-08-15 | End: 2024-08-15

## 2024-08-15 RX ORDER — ONDANSETRON 2 MG/ML
INJECTION INTRAMUSCULAR; INTRAVENOUS AS NEEDED
Status: DISCONTINUED | OUTPATIENT
Start: 2024-08-15 | End: 2024-08-15

## 2024-08-15 RX ORDER — KETOROLAC TROMETHAMINE 30 MG/ML
INJECTION, SOLUTION INTRAMUSCULAR; INTRAVENOUS AS NEEDED
Status: DISCONTINUED | OUTPATIENT
Start: 2024-08-15 | End: 2024-08-15

## 2024-08-15 RX ORDER — HYDROMORPHONE HCL IN WATER/PF 6 MG/30 ML
0.2 PATIENT CONTROLLED ANALGESIA SYRINGE INTRAVENOUS
Status: DISCONTINUED | OUTPATIENT
Start: 2024-08-15 | End: 2024-08-15 | Stop reason: HOSPADM

## 2024-08-15 RX ORDER — SODIUM CHLORIDE, SODIUM LACTATE, POTASSIUM CHLORIDE, CALCIUM CHLORIDE 600; 310; 30; 20 MG/100ML; MG/100ML; MG/100ML; MG/100ML
INJECTION, SOLUTION INTRAVENOUS CONTINUOUS PRN
Status: DISCONTINUED | OUTPATIENT
Start: 2024-08-15 | End: 2024-08-15

## 2024-08-15 RX ORDER — ACETAMINOPHEN 325 MG/1
975 TABLET ORAL EVERY 6 HOURS SCHEDULED
Status: DISCONTINUED | OUTPATIENT
Start: 2024-08-15 | End: 2024-08-15

## 2024-08-15 RX ORDER — HYDROMORPHONE HCL/PF 1 MG/ML
0.5 SYRINGE (ML) INJECTION EVERY 4 HOURS PRN
Status: DISCONTINUED | OUTPATIENT
Start: 2024-08-15 | End: 2024-08-15

## 2024-08-15 RX ORDER — FENTANYL CITRATE/PF 50 MCG/ML
25 SYRINGE (ML) INJECTION
Status: COMPLETED | OUTPATIENT
Start: 2024-08-15 | End: 2024-08-15

## 2024-08-15 RX ORDER — PROPOFOL 10 MG/ML
INJECTION, EMULSION INTRAVENOUS CONTINUOUS PRN
Status: DISCONTINUED | OUTPATIENT
Start: 2024-08-15 | End: 2024-08-15

## 2024-08-15 RX ORDER — DEXAMETHASONE SODIUM PHOSPHATE 10 MG/ML
INJECTION, SOLUTION INTRAMUSCULAR; INTRAVENOUS AS NEEDED
Status: DISCONTINUED | OUTPATIENT
Start: 2024-08-15 | End: 2024-08-15

## 2024-08-15 RX ORDER — SCOLOPAMINE TRANSDERMAL SYSTEM 1 MG/1
1 PATCH, EXTENDED RELEASE TRANSDERMAL ONCE
Status: DISCONTINUED | OUTPATIENT
Start: 2024-08-15 | End: 2024-08-15

## 2024-08-15 RX ORDER — ONDANSETRON 2 MG/ML
4 INJECTION INTRAMUSCULAR; INTRAVENOUS EVERY 4 HOURS PRN
Status: DISCONTINUED | OUTPATIENT
Start: 2024-08-15 | End: 2024-08-15

## 2024-08-15 RX ORDER — SODIUM CHLORIDE, SODIUM LACTATE, POTASSIUM CHLORIDE, CALCIUM CHLORIDE 600; 310; 30; 20 MG/100ML; MG/100ML; MG/100ML; MG/100ML
100 INJECTION, SOLUTION INTRAVENOUS CONTINUOUS
Status: DISCONTINUED | OUTPATIENT
Start: 2024-08-15 | End: 2024-08-15 | Stop reason: HOSPADM

## 2024-08-15 RX ORDER — OXYCODONE AND ACETAMINOPHEN 5; 325 MG/1; MG/1
1 TABLET ORAL EVERY 4 HOURS PRN
Status: DISCONTINUED | OUTPATIENT
Start: 2024-08-15 | End: 2024-08-15 | Stop reason: HOSPADM

## 2024-08-15 RX ORDER — OXYCODONE HYDROCHLORIDE 5 MG/1
5 TABLET ORAL EVERY 4 HOURS PRN
Status: DISCONTINUED | OUTPATIENT
Start: 2024-08-15 | End: 2024-08-15

## 2024-08-15 RX ORDER — CEFAZOLIN SODIUM 1 G/3ML
INJECTION, POWDER, FOR SOLUTION INTRAMUSCULAR; INTRAVENOUS AS NEEDED
Status: DISCONTINUED | OUTPATIENT
Start: 2024-08-15 | End: 2024-08-15

## 2024-08-15 RX ORDER — SODIUM CHLORIDE, SODIUM LACTATE, POTASSIUM CHLORIDE, CALCIUM CHLORIDE 600; 310; 30; 20 MG/100ML; MG/100ML; MG/100ML; MG/100ML
125 INJECTION, SOLUTION INTRAVENOUS CONTINUOUS
Status: DISCONTINUED | OUTPATIENT
Start: 2024-08-15 | End: 2024-08-15

## 2024-08-15 RX ORDER — ONDANSETRON 2 MG/ML
4 INJECTION INTRAMUSCULAR; INTRAVENOUS ONCE AS NEEDED
Status: DISCONTINUED | OUTPATIENT
Start: 2024-08-15 | End: 2024-08-15 | Stop reason: HOSPADM

## 2024-08-15 RX ORDER — LANOLIN ALCOHOL/MO/W.PET/CERES
3 CREAM (GRAM) TOPICAL
Status: DISCONTINUED | OUTPATIENT
Start: 2024-08-15 | End: 2024-08-15

## 2024-08-15 RX ORDER — BUPIVACAINE HYDROCHLORIDE AND EPINEPHRINE 2.5; 5 MG/ML; UG/ML
INJECTION, SOLUTION INFILTRATION; PERINEURAL AS NEEDED
Status: DISCONTINUED | OUTPATIENT
Start: 2024-08-15 | End: 2024-08-15 | Stop reason: HOSPADM

## 2024-08-15 RX ORDER — MIDAZOLAM HYDROCHLORIDE 2 MG/2ML
INJECTION, SOLUTION INTRAMUSCULAR; INTRAVENOUS AS NEEDED
Status: DISCONTINUED | OUTPATIENT
Start: 2024-08-15 | End: 2024-08-15

## 2024-08-15 RX ORDER — FENTANYL CITRATE 50 UG/ML
INJECTION, SOLUTION INTRAMUSCULAR; INTRAVENOUS AS NEEDED
Status: DISCONTINUED | OUTPATIENT
Start: 2024-08-15 | End: 2024-08-15

## 2024-08-15 RX ORDER — PROPOFOL 10 MG/ML
INJECTION, EMULSION INTRAVENOUS AS NEEDED
Status: DISCONTINUED | OUTPATIENT
Start: 2024-08-15 | End: 2024-08-15

## 2024-08-15 RX ORDER — HYDRALAZINE HYDROCHLORIDE 20 MG/ML
10 INJECTION INTRAMUSCULAR; INTRAVENOUS EVERY 4 HOURS PRN
Status: DISCONTINUED | OUTPATIENT
Start: 2024-08-15 | End: 2024-08-15

## 2024-08-15 RX ORDER — LABETALOL HYDROCHLORIDE 5 MG/ML
10 INJECTION, SOLUTION INTRAVENOUS EVERY 4 HOURS PRN
Status: DISCONTINUED | OUTPATIENT
Start: 2024-08-15 | End: 2024-08-15

## 2024-08-15 RX ADMIN — CEFAZOLIN 2000 MG: 1 INJECTION, POWDER, FOR SOLUTION INTRAMUSCULAR; INTRAVENOUS at 09:57

## 2024-08-15 RX ADMIN — GLYCOPYRROLATE 0.1 MG: 0.2 INJECTION INTRAMUSCULAR; INTRAVENOUS at 09:55

## 2024-08-15 RX ADMIN — FENTANYL CITRATE 25 MCG: 50 INJECTION INTRAMUSCULAR; INTRAVENOUS at 11:14

## 2024-08-15 RX ADMIN — FENTANYL CITRATE 25 MCG: 50 INJECTION INTRAMUSCULAR; INTRAVENOUS at 11:28

## 2024-08-15 RX ADMIN — PROPOFOL 200 MG: 10 INJECTION, EMULSION INTRAVENOUS at 09:53

## 2024-08-15 RX ADMIN — LIDOCAINE HYDROCHLORIDE 5 ML: 10 INJECTION, SOLUTION EPIDURAL; INFILTRATION; INTRACAUDAL at 09:10

## 2024-08-15 RX ADMIN — MIDAZOLAM 2 MG: 1 INJECTION INTRAMUSCULAR; INTRAVENOUS at 09:51

## 2024-08-15 RX ADMIN — FENTANYL CITRATE 25 MCG: 50 INJECTION INTRAMUSCULAR; INTRAVENOUS at 09:57

## 2024-08-15 RX ADMIN — SODIUM CHLORIDE, SODIUM LACTATE, POTASSIUM CHLORIDE, AND CALCIUM CHLORIDE: .6; .31; .03; .02 INJECTION, SOLUTION INTRAVENOUS at 10:35

## 2024-08-15 RX ADMIN — HYDROMORPHONE HYDROCHLORIDE 0.2 MG: 0.2 INJECTION, SOLUTION INTRAMUSCULAR; INTRAVENOUS; SUBCUTANEOUS at 11:38

## 2024-08-15 RX ADMIN — ONDANSETRON 4 MG: 2 INJECTION INTRAMUSCULAR; INTRAVENOUS at 09:53

## 2024-08-15 RX ADMIN — PROPOFOL 30 MG: 10 INJECTION, EMULSION INTRAVENOUS at 09:57

## 2024-08-15 RX ADMIN — KETOROLAC TROMETHAMINE 30 MG: 30 INJECTION, SOLUTION INTRAMUSCULAR; INTRAVENOUS at 10:33

## 2024-08-15 RX ADMIN — SODIUM CHLORIDE, SODIUM LACTATE, POTASSIUM CHLORIDE, AND CALCIUM CHLORIDE: .6; .31; .03; .02 INJECTION, SOLUTION INTRAVENOUS at 09:51

## 2024-08-15 RX ADMIN — PROPOFOL 100 MCG/KG/MIN: 10 INJECTION, EMULSION INTRAVENOUS at 09:55

## 2024-08-15 RX ADMIN — FENTANYL CITRATE 25 MCG: 50 INJECTION INTRAMUSCULAR; INTRAVENOUS at 10:22

## 2024-08-15 RX ADMIN — LIDOCAINE HYDROCHLORIDE 50 MG: 10 INJECTION, SOLUTION EPIDURAL; INFILTRATION; INTRACAUDAL at 09:53

## 2024-08-15 RX ADMIN — SCOPOLAMINE 1 PATCH: 1.5 PATCH, EXTENDED RELEASE TRANSDERMAL at 09:47

## 2024-08-15 RX ADMIN — DEXAMETHASONE SODIUM PHOSPHATE 10 MG: 10 INJECTION, SOLUTION INTRAMUSCULAR; INTRAVENOUS at 09:53

## 2024-08-15 RX ADMIN — FENTANYL CITRATE 25 MCG: 50 INJECTION INTRAMUSCULAR; INTRAVENOUS at 11:09

## 2024-08-15 RX ADMIN — FENTANYL CITRATE 25 MCG: 50 INJECTION INTRAMUSCULAR; INTRAVENOUS at 11:19

## 2024-08-15 NOTE — ANESTHESIA POSTPROCEDURE EVALUATION
Post-Op Assessment Note    CV Status:  Stable    Pain management: adequate       Mental Status:  Alert and awake   Hydration Status:  Euvolemic   PONV Controlled:  Controlled   Airway Patency:  Patent     Post Op Vitals Reviewed: Yes    No anethesia notable event occurred.    Staff: CRNA               BP   144/66   Temp      Pulse  63   Resp      SpO2   95 RA

## 2024-08-15 NOTE — DISCHARGE INSTR - AVS FIRST PAGE
POST-OPERATIVE BREAST CARE INSTRUCTIONS    Wound Closure/Dressing:            Your wound is closed with:   surgical glue, it will come off after 2-3 weeks.         Dissolvable sutures-underneath the skin    Wound care:    If you have gauze over your wound you may remove it the day after surgery. Leave the Steri-strips on, they will fall off on their own in 1-2 weeks  You may shower using soap and water to clean your wound. Gently pat dry.                  Drain Care:  If you do not have a drain, disregard this section.    Visiting Nursing should have been arranged upon discharge from hospital.  A nurse will call your home to schedule an initial visit.  Please call the number below if you have not received a call within 48 hours of hospital discharge.   You may shower with the EUSEBIO drains.  Wash area around the drains with soap and water and pat dry.  Record drain outputs on chart given to you at pre op visit and bring that chart to office at post op appt  EUSEBIO drains can be removed in the office by a nurse either at post op visit OR when drain output is 30 cc’s or less in a 24 hour period for three days in a row.  If you had plastic surgery or reconstructive surgery, the plastic surgeon will manage the drains.    Other:      You can begin wearing your own bra when it feels comfortable.    You may resume using deodorant the day after surgery                 Post-op visit:  your post-op visit is scheduled for:  8/28/2024 @ 9:00 AM    Call our office at [phone] if you have any  of the following:    Redness, swelling, heat, unusual drainage or heavy bleeding from wound.  Fever greater than 101.°  Pain not relieved by medication.  8/28/2024 @ 9:00 AM

## 2024-08-15 NOTE — INTERVAL H&P NOTE
Corrina and wire in good position,  H&P reviewed. After examining the patient I find no changes in the patients condition since the H&P had been written.    Vitals:    08/15/24 0835   BP: (!) 198/101   Pulse:    Resp:    Temp:    SpO2:

## 2024-08-15 NOTE — OP NOTE
OPERATIVE REPORT  PATIENT NAME: April Casey    :  1970  MRN: [id number]  Pt Location: [facility] 02    SURGERY DATE: 8/15/2024    Surgeons and Role:     * Alcon López MD - Primary     * Noah Crouch MD - Assisting    Preop Diagnosis:  Atypical ductal hyperplasia of left breast [N60.92]    Post-Op Diagnosis Codes:     * Atypical ductal hyperplasia of left breast [N60.92]    Procedure(s):  Left - BREAST BRACKETED LUMPECTOMY WITH DAMEON  GUIDANCE AND NEEDLE LOCALIZATION (NEEDLE LOC IN MAMMO AT 9AM)    Specimen(s):  ID Type Source Tests Collected by Time Destination   1 : Right breast bracketed lumpectomy - Marked per protocol Tissue Breast, Left TISSUE EXAM Alcon López MD 8/15/2024 1010        Estimated Blood Loss:   Minimal    Drains:  * No LDAs found *    Anesthesia Type:   General    Operative Indications:  Atypical ductal hyperplasia of left breast [N60.92]      Operative Findings:  Good specimen radiograph, dameon was just under the nipple, it was close on the specimen radiograph, but any further resection would need to be a NAC removal.      Complications:   None    Procedure and Technique:  I previously reviewed the post biopsy images.      Lumpectomy  The patient was brought to the operation room and placed under general anesthesia.   Attention was paid to ensure appropriate padding and correct positioning.  The DAMEON  detector was then used to locate the position of the DAMEON deflector and the skin was marked in this area. The patient had a wire localization placed at the more posterior location. The left breast was prepped and draped in a sterile fashion.  I initiated a time-out, identifying the patient, the correct side and the above procedure.  All parties agreed with the time out.     The planned incision between the save and the wire was then injected with 0.25% Marcaine for local anesthesia.  The incision was sharply incised.  The DAMEON dectector was used to guide the anterior/NAC dissection  and the wire was more lateral.  Superior, inferior, medial and lateral planes were developed around the DAMEON deflector and the wire.  Meticulous dissection between the visualized DAMEON clip and the base of the nipple was accomplished.  The specimen was truncated with electrocautery beyond the deflector and wire tip.    The specimen was then inked for orientation purposes.  A specimen radiograph was taken in two dimensions.  The dameon clip was just at the edge of the specimen, but any further resection in this area would be minimal or would require removal of the NAC. The specimen was sent to pathology for permanent analysis.  Superficial bleeding controlled with electrocautery and the wound was extensively irrigated.  The wound was closed with two layers, an inner layer of 3-0 Monocyl and a subcuticular layer of 4-0 PDS.  Exofin was applied.  The counts were correct x 2.    I was present for the entire procedure.    Patient Disposition:  PACU     This procedure was not performed to treat breast cancer through sentinel node biopsy      SIGNATURE: Alcon López MD  DATE: August 15, 2024  TIME: 10:46 AM

## 2024-08-15 NOTE — ANESTHESIA PREPROCEDURE EVALUATION
Procedure:  BREAST BRACKETED LUMPECTOMY WITH JOHAN  GUIDANCE AND NEEDLE LOCALIZATION (NEEDLE LOC IN MAMMO AT 9AM) (Left: Breast)    Relevant Problems   ANESTHESIA   (+) PONV (postoperative nausea and vomiting)      CARDIO   (+) Benign essential hypertension      HEMATOLOGY   (+) Iron deficiency anemia      MUSCULOSKELETAL   (+) Back pain, lumbosacral      NEURO/PSYCH   (+) Leg paresthesia   (+) Tension headache   (+) Vision disturbance      Other   (+) Lymphadenopathy of right cervical region        Physical Exam    Airway    Mallampati score: III  TM Distance: >3 FB  Neck ROM: full     Dental   No notable dental hx     Cardiovascular      Pulmonary      Other Findings  post-pubertal.      Anesthesia Plan  ASA Score- 2     Anesthesia Type- general with ASA Monitors.         Additional Monitors:     Airway Plan: LMA.           Plan Factors-Exercise tolerance (METS): >4 METS.    Chart reviewed. EKG reviewed. Imaging results reviewed. Existing labs reviewed. Patient summary reviewed.    Patient is not a current smoker.              Induction- intravenous.    Postoperative Plan-     Perioperative Resuscitation Plan - Level 1 - Full Code.       Informed Consent- Anesthetic plan and risks discussed with patient.  I personally reviewed this patient with the CRNA. Discussed and agreed on the Anesthesia Plan with the CRNA..

## 2024-08-16 ENCOUNTER — ANCILLARY PROCEDURE (OUTPATIENT)
Dept: LAB | Facility: HOSPITAL | Age: 54
End: 2024-08-16
Attending: SURGERY

## 2024-08-22 ENCOUNTER — TELEPHONE (OUTPATIENT)
Age: 54
End: 2024-08-22

## 2024-08-22 PROCEDURE — 88341 IMHCHEM/IMCYTCHM EA ADD ANTB: CPT | Performed by: STUDENT IN AN ORGANIZED HEALTH CARE EDUCATION/TRAINING PROGRAM

## 2024-08-22 PROCEDURE — 88342 IMHCHEM/IMCYTCHM 1ST ANTB: CPT | Performed by: STUDENT IN AN ORGANIZED HEALTH CARE EDUCATION/TRAINING PROGRAM

## 2024-08-22 PROCEDURE — 88307 TISSUE EXAM BY PATHOLOGIST: CPT | Performed by: STUDENT IN AN ORGANIZED HEALTH CARE EDUCATION/TRAINING PROGRAM

## 2024-08-22 NOTE — TELEPHONE ENCOUNTER
Patient needs a work note stating she can can return to work on 8/26/24 with no restrictions.  Send to [employer]GIMI when complete.    Please call April when the letter is uploaded into chart.

## 2024-08-26 PROBLEM — D05.02 LOBULAR CARCINOMA IN SITU (LCIS) OF LEFT BREAST: Status: ACTIVE | Noted: 2024-08-26

## 2024-08-28 ENCOUNTER — OFFICE VISIT (OUTPATIENT)
Dept: SURGICAL ONCOLOGY | Facility: CLINIC | Age: 54
End: 2024-08-28

## 2024-08-28 VITALS
TEMPERATURE: 97.1 F | BODY MASS INDEX: 35.79 KG/M2 | HEIGHT: 62 IN | OXYGEN SATURATION: 98 % | RESPIRATION RATE: 16 BRPM | SYSTOLIC BLOOD PRESSURE: 162 MMHG | WEIGHT: 194.5 LBS | HEART RATE: 78 BPM | DIASTOLIC BLOOD PRESSURE: 89 MMHG

## 2024-08-28 DIAGNOSIS — Z91.89 INCREASED RISK OF BREAST CANCER: ICD-10-CM

## 2024-08-28 DIAGNOSIS — Z98.890 STATUS POST LEFT BREAST LUMPECTOMY: ICD-10-CM

## 2024-08-28 DIAGNOSIS — D05.02 LOBULAR CARCINOMA IN SITU (LCIS) OF LEFT BREAST: ICD-10-CM

## 2024-08-28 DIAGNOSIS — Z71.89 OTHER SPECIFIED COUNSELING: Primary | ICD-10-CM

## 2024-08-28 PROCEDURE — 99024 POSTOP FOLLOW-UP VISIT: CPT | Performed by: SURGERY

## 2024-08-28 NOTE — PROGRESS NOTES
Surgical Oncology Breast Post-Op       [facility] [place]  [address]  [place] [address]    April Casey  1970  [id number]  [facility] [place]  [address]  [place] [address]    Chief Complaint:   April is seen for a post-operative visit of her recent breast surgery.      Oncology History:     Oncology History   Lobular carcinoma in situ (LCIS) of left breast   6/18/2024 Biopsy    Left breast stereotactic biopsy  1 o'clock, anterior portion (tri-bell)  Atypical ductal hyperplasia  Background breast parenchyma with fibrocystic change including microcysts, usual ductal hyperplasia, columnar cell change, and stromal fibrosis    Concordant. High risk lesion appears unifocal; calcifications cover an area of 3.7 cm. US left axilla not performed. Right breast clear on 4/2024 mammo.     8/15/2024 Surgery    Left breast bracketed lumpectomy with JOHAN  guidance and needle localization  Lobular carcinoma in situ, classic type, scattered foci in a background of extensive atypical lobular hyperplasia  ADH  FEA, columnar cell change/hyperplasia, sclerosing adenosis, apocrine metaplasia, and a radial scar  Biopsy site changes present         Assessment & Plan:   Assessment & Plan    Patient presents with a postoperative excisional biopsy demonstrated LCIS as well as other atypia is.  Her lifetime risk on TCH was 60% I think this may overestimate her risk but is certainly elevated.  I recommended she have annual 3D mammograms with intermittent MRIs or possibly annual contrast-enhanced mammograms.  I educated the pros and cons of additional imaging particular with MRIs.  The patient has no family history of breast cancer.  She will follow-up with our nurse practitioner in 6 months we will coordinate a mammogram and have further discussions regarding MRIs.  I did recommend if  contrast-enhanced mammograms are available that would be a very reasonable option and help minimize unnecessary radiographic and biopsy interventions.  Patient's wound is healing quite well.    History of Present Illness:   See Oncology History    Interval History:   See Assessment & Plan, patient complains of some sharp spasms underneath her nipple as well as hyperesthesias and some sharp spasms near the surgical site.  There is no evidence of infection.    Review of Systems:   Review of Systems   All other systems reviewed and are negative.      Past Medical History:     Patient Active Problem List   Diagnosis    Sleep disorder    Iron deficiency anemia    Class 1 obesity with alveolar hypoventilation without serious comorbidity with body mass index (BMI) of 32.0 to 32.9 in adult (HCC)    Bilateral carpal tunnel syndrome    Benign essential hypertension    Back pain, lumbosacral    Leg paresthesia    Onychomycosis of left great toe    Vision disturbance    Atopic neurodermatitis    Pes anserine bursitis    Lymphadenopathy of right cervical region    Plantar fasciitis of left foot    Tension headache    Localized swelling of finger of left hand    Infection of thumb    Acute viral syndrome    Right foot pain    PONV (postoperative nausea and vomiting)    Lobular carcinoma in situ (LCIS) of left breast     Past Medical History:   Diagnosis Date    Acute diffuse otitis externa of left ear 10/24/2019    Anemia     Atopic dermatitis 08/06/2015    Atypical chest pain 04/02/2015    BPPV (benign paroxysmal positional vertigo) 01/05/2017    Chest pain     Class 1 obesity with alveolar hypoventilation without serious comorbidity with body mass index (BMI) of 32.0 to 32.9 in adult (HCC) 07/20/2017    COVID-19 virus infection 10/09/2023    Elevated liver function tests 04/26/2018    Gastroenteritis     Hypertension     Iron deficiency anemia 03/30/2017    Lateral epicondylitis of right elbow 01/11/2018    PONV (postoperative  nausea and vomiting)      Past Surgical History:   Procedure Laterality Date    BREAST LUMPECTOMY Left 8/15/2024    Procedure: BREAST BRACKETED LUMPECTOMY WITH JOHAN  GUIDANCE AND NEEDLE LOCALIZATION (NEEDLE LOC IN MAMMO AT 9AM);  Surgeon: Alcon López MD;  Location: [facility];  Service: Surgical Oncology    FOOT SURGERY Left     MAMMO NEEDLE LOCALIZATION LEFT (ALL INC) Left 08/02/2024    MAMMO NEEDLE LOCALIZATION LEFT (ALL INC) Left 8/15/2024    MAMMO STEREOTACTIC BREAST BIOPSY LEFT (ALL INC) Left 06/18/2024     Family History   Problem Relation Age of Onset    Diabetes Mother         Diabetes Mellitus    No Known Problems Father     No Known Problems Sister     No Known Problems Sister     No Known Problems Daughter     No Known Problems Maternal Aunt     No Known Problems Maternal Aunt     No Known Problems Maternal Aunt     No Known Problems Maternal Aunt     No Known Problems Maternal Aunt     No Known Problems Maternal Aunt     No Known Problems Maternal Aunt     No Known Problems Maternal Aunt     No Known Problems Maternal Aunt     No Known Problems Maternal Grandmother     No Known Problems Maternal Grandfather     No Known Problems Paternal Grandmother     No Known Problems Paternal Grandfather     Diabetes Other         Diabetes Mellitus     Social History     Socioeconomic History    Marital status: /Civil Union     Spouse name: Not on file    Number of children: Not on file    Years of education: Not on file    Highest education level: Not on file   Occupational History    Not on file   Tobacco Use    Smoking status: Never    Smokeless tobacco: Never   Vaping Use    Vaping status: Never Used   Substance and Sexual Activity    Alcohol use: No    Drug use: No    Sexual activity: Not on file   Other Topics Concern    Not on file   Social History Narrative    Advance directive declined by patient     Social Determinants of Health     Financial Resource Strain: Not on file   Food Insecurity: Not on  file   Transportation Needs: Not on file   Physical Activity: Not on file   Stress: Not on file   Social Connections: Not on file   Intimate Partner Violence: Not on file   Housing Stability: Not on file       Current Outpatient Medications:     ferrous sulfate 325 (65 Fe) mg tablet, Take 325 mg by mouth daily with breakfast, Disp: , Rfl:     meloxicam (MOBIC) 15 mg tablet, Take 15 mg by mouth daily, Disp: , Rfl:     mometasone (ELOCON) 0.1 % ointment, Apply topically daily, Disp: 45 g, Rfl: 1    naproxen (Naprosyn) 500 mg tablet, Take 1 tablet (500 mg total) by mouth 2 (two) times a day with meals for 7 days, Disp: 14 tablet, Rfl: 0    oxyCODONE-acetaminophen (Percocet) 5-325 mg per tablet, Take 1 tablet by mouth every 6 (six) hours as needed for moderate pain Max Daily Amount: 4 tablets, Disp: 6 tablet, Rfl: 0    valsartan (DIOVAN) 320 MG tablet, Take 1 tablet (320 mg total) by mouth daily, Disp: 90 tablet, Rfl: 3  Allergies   Allergen Reactions    Medrol [Methylprednisolone] Rash    Amoxicillin Rash    Penicillins Rash    Prednisone Rash    Prevpac [Izamldlqg-Bkpwzujqa-Zocqtaief] Rash       Physical Exams:     Vitals:    08/28/24 0856   BP: 162/89   Pulse: 78   Resp: 16   Temp: (!) 97.1 °F (36.2 °C)   SpO2: 98%     Physical Exam  Chest:             Results & Discussion:   I explained to the patient that her hyperesthesias at the nipple as well as her sharp spasms are anticipated.  Her surgery was mostly underneath the nipple and I did state in my operative report if she had a invasive cancer she would need a central lumpectomy however no invasive cancer was identified and no ductal carcinoma in situ.  I did clarify that the LCIS leaves her at elevated risk we went over different risk calculators and I also provided her a copy of her TC 8 risk which estimates a lifetime risk going at 85 years old of 60%.  All questions were answered to the patient's satisfaction.  We also talked about chemoprevention which might  be a reasonable option if she had heard there are many side effects explained that it might be in her best interest to have a conversation with Dr. Mathis in consider initiating therapy if she was interested which she could always stop if she had significant/unwanted toxicity.

## 2024-09-10 ENCOUNTER — RA CDI HCC (OUTPATIENT)
Dept: OTHER | Facility: HOSPITAL | Age: 54
End: 2024-09-10

## 2024-09-17 ENCOUNTER — OFFICE VISIT (OUTPATIENT)
Age: 54
End: 2024-09-17
Payer: COMMERCIAL

## 2024-09-17 VITALS
SYSTOLIC BLOOD PRESSURE: 138 MMHG | HEART RATE: 76 BPM | DIASTOLIC BLOOD PRESSURE: 88 MMHG | TEMPERATURE: 97.5 F | OXYGEN SATURATION: 98 % | HEIGHT: 63 IN | WEIGHT: 193 LBS | BODY MASS INDEX: 34.2 KG/M2

## 2024-09-17 DIAGNOSIS — M79.662 PAIN OF LEFT CALF: Primary | ICD-10-CM

## 2024-09-17 DIAGNOSIS — D50.8 OTHER IRON DEFICIENCY ANEMIA: ICD-10-CM

## 2024-09-17 DIAGNOSIS — I10 BENIGN ESSENTIAL HYPERTENSION: ICD-10-CM

## 2024-09-17 DIAGNOSIS — D05.02 LOBULAR CARCINOMA IN SITU (LCIS) OF LEFT BREAST: ICD-10-CM

## 2024-09-17 PROCEDURE — 99214 OFFICE O/P EST MOD 30 MIN: CPT | Performed by: INTERNAL MEDICINE

## 2024-09-17 NOTE — ASSESSMENT & PLAN NOTE
Unlikely to be DVT however with recent surgery and left breast biopsy positive for LCIS we will check venous Doppler   Orders:    VAS VENOUS DUPLEX -LOWER LIMB UNILATERAL; Future

## 2024-09-17 NOTE — ASSESSMENT & PLAN NOTE
Hemoglobin level is normal.  RBC indices are normal.  We will discontinue iron replacement therapy and follow-up in 6 months.

## 2024-09-17 NOTE — PROGRESS NOTES
Ambulatory Visit  Name: April Casey      : 1970      MRN: [id number]  Encounter Provider: Ortega Delgado MD  Encounter Date: 2024   Encounter department: [facility] [place]    Assessment & Plan  Pain of left calf  Unlikely to be DVT however with recent surgery and left breast biopsy positive for LCIS we will check venous Doppler   Orders:    VAS VENOUS DUPLEX -LOWER LIMB UNILATERAL; Future    Lobular carcinoma in situ (LCIS) of left breast  Has follow-up scheduled with hematology oncology to discuss treatment options.       Other iron deficiency anemia  Hemoglobin level is normal.  RBC indices are normal.  We will discontinue iron replacement therapy and follow-up in 6 months.         Benign essential hypertension  Blood pressure is improved.  No changes to current therapy.            History of Present Illness     Patient presents to the office for follow-up visit for history of iron deficiency anemia.  She recently underwent a left breast biopsy and was found to have lobular carcinoma in situ.  She has a follow-up scheduled with hematology oncology to review treatment options.  She complains of 2 issues coming to the office today.  The first is of left calf pain which has been present for the past several days.  She denies any swelling of her left lower extremity but states that the pain bothers her when she is standing and occasionally when she is walking.  It does not awaken her from sleep.  She denies any associated chest pain or shortness of breath but does make mention of some left-sided chest discomfort in her upper chest which radiates to her back.  Not associated with any palpitations, diaphoresis, shortness of breath, cough, wheezing or lightheadedness. The pain sometimes lasts about 15 minutes before resolving spontaneously.  It is not associated with changes in position.  It is not associated with breathing.  It is not precipitated by activity.  She  "rates it a 3 out of 10 in severity.  Recent labs are reviewed.  Her CBC is essentially normal with normal RBC indices.  Hemoglobin is 12.3 CMP is essentially normal with a fasting glucose of 107 otherwise no abnormalities.          Review of Systems   Constitutional: Negative.  Negative for activity change, appetite change, chills, diaphoresis, fatigue, fever and unexpected weight change.   HENT: Negative.     Eyes: Negative.    Respiratory: Negative.     Cardiovascular:  Positive for chest pain (As per HPI).   Gastrointestinal: Negative.    Endocrine: Negative.    Genitourinary: Negative.    Musculoskeletal:  Positive for myalgias (Left calf pain).   Skin: Negative.    Neurological: Negative.    Hematological: Negative.    Psychiatric/Behavioral:  The patient is not nervous/anxious.            Objective     /88 (BP Location: Left arm, Patient Position: Sitting, Cuff Size: Standard)   Pulse 76   Temp 97.5 °F (36.4 °C) (Temporal)   Ht 5' 3\" (1.6 m)   Wt 87.5 kg (193 lb)   SpO2 98%   BMI 34.19 kg/m²     Physical Exam  Vitals reviewed.   Constitutional:       General: She is not in acute distress.     Appearance: She is well-developed. She is obese. She is not ill-appearing, toxic-appearing or diaphoretic.   HENT:      Head: Normocephalic and atraumatic.   Eyes:      Pupils: Pupils are equal, round, and reactive to light.   Neck:      Thyroid: No thyromegaly.      Vascular: No JVD.   Cardiovascular:      Rate and Rhythm: Normal rate and regular rhythm.      Pulses:           Carotid pulses are 2+ on the right side and 2+ on the left side.     Heart sounds: Normal heart sounds. No murmur heard.  Pulmonary:      Effort: Pulmonary effort is normal. No tachypnea, accessory muscle usage or respiratory distress.      Breath sounds: Normal breath sounds. No stridor. No decreased breath sounds, wheezing, rhonchi or rales.   Chest:      Chest wall: No mass, deformity, tenderness, crepitus or edema. There is no " dullness to percussion.   Abdominal:      Palpations: Abdomen is soft.      Tenderness: There is no abdominal tenderness.   Musculoskeletal:         General: Normal range of motion.      Cervical back: Normal range of motion and neck supple.      Right lower leg: No tenderness. No edema.      Left lower leg: No tenderness. No edema.   Lymphadenopathy:      Cervical: No cervical adenopathy.   Skin:     General: Skin is warm and dry.      Coloration: Skin is not cyanotic.      Findings: No ecchymosis, erythema or rash.      Nails: There is no clubbing.   Neurological:      General: No focal deficit present.      Mental Status: She is alert and oriented to person, place, and time.   Psychiatric:         Mood and Affect: Mood normal.         Behavior: Behavior normal.

## 2024-10-04 ENCOUNTER — OFFICE VISIT (OUTPATIENT)
Dept: URGENT CARE | Age: 54
End: 2024-10-04
Payer: COMMERCIAL

## 2024-10-04 VITALS
OXYGEN SATURATION: 96 % | DIASTOLIC BLOOD PRESSURE: 75 MMHG | RESPIRATION RATE: 18 BRPM | TEMPERATURE: 97.8 F | SYSTOLIC BLOOD PRESSURE: 136 MMHG | HEART RATE: 81 BPM

## 2024-10-04 DIAGNOSIS — L03.012 PARONYCHIA OF LEFT INDEX FINGER: Primary | ICD-10-CM

## 2024-10-04 PROCEDURE — G0382 LEV 3 HOSP TYPE B ED VISIT: HCPCS

## 2024-10-04 PROCEDURE — S9083 URGENT CARE CENTER GLOBAL: HCPCS

## 2024-10-04 RX ORDER — SULFAMETHOXAZOLE/TRIMETHOPRIM 800-160 MG
1 TABLET ORAL EVERY 12 HOURS SCHEDULED
Qty: 14 TABLET | Refills: 0 | Status: SHIPPED | OUTPATIENT
Start: 2024-10-04 | End: 2024-10-11

## 2024-10-04 NOTE — LETTER
October 4, 2024     Patient: April Casey   YOB: 1970   Date of Visit: 10/4/2024       To Whom it May Concern:    April Casey was seen in my clinic on 10/4/2024. She may return on 10/06/2024.     If you have any questions or concerns, please don't hesitate to call.         Sincerely,          COLLEEN Salter        CC: No Recipients

## 2024-10-04 NOTE — PATIENT INSTRUCTIONS
Please begin antibiotics as directed.   Please begin Epsom salt soaks as directed.   Follow up with PCP if no relief within one week.

## 2024-10-04 NOTE — PROGRESS NOTES
[facility]        NAME: April Casey is a 54 y.o. female  : 1970    MRN: [id number]  DATE: 2024  TIME: 6:49 PM    Assessment and Plan   Paronychia of left index finger [L03.012]  1. Paronychia of left index finger  sulfamethoxazole-trimethoprim (BACTRIM DS) 800-160 mg per tablet      Please begin antibiotics as directed.   Please begin Epsom salt soaks as directed.   Follow up with PCP if no relief within one week.       Patient Instructions     Patient Education     Paronychia ED   General Information   You came to the Emergency Department (ED) for a skin infection around your nail. The medical name for this is paronychia. The skin around your nail may be sore, red, or swollen. You may have small blisters near your nail. Sometimes, the doctor will drain the area of infection. The doctors may give you antibiotics to treat the infection. If so, it is important to take all of your antibiotics, even if you start to feel better.  What care is needed at home?   Call your regular doctor to let them know you were in the ED. Make a follow-up appointment if you were told to.  Soak your nail in warm water for 20 minutes, 3 times each day.  Put an antibiotic cream or ointment on the infected area after soaking. Then, place a clean, dry dressing over the area.  Try not to bite your nails or cut your cuticles. Doing these things can increase your risk of getting another infection in the nail area.  When do I need to call the doctor?   You have a fever of 100.4°F (38°C) or higher or chills.  You have worsening of the infection around your nail, like swelling, redness, warmth, pain, or fluid draining from the wound.  You have new or worsening symptoms.  Last Reviewed Date   2021-06-15  Consumer Information Use and Disclaimer   This generalized information is a limited summary of diagnosis, treatment, and/or medication information. It is not meant to be comprehensive and should be used as a tool to help  the user understand and/or assess potential diagnostic and treatment options. It does NOT include all information about conditions, treatments, medications, side effects, or risks that may apply to a specific patient. It is not intended to be medical advice or a substitute for the medical advice, diagnosis, or treatment of a health care provider based on the health care provider's examination and assessment of a patient’s specific and unique circumstances. Patients must speak with a health care provider for complete information about their health, medical questions, and treatment options, including any risks or benefits regarding use of medications. This information does not endorse any treatments or medications as safe, effective, or approved for treating a specific patient. [employer] and its affiliates disclaim any warranty or liability relating to this information or the use thereof. The use of this information is governed by the Terms of Use, available at https://www.[employer].Bringrr/en/know/clinical-effectiveness-terms   Copyright   Copyright © 2024 [employer] and its affiliates and/or licensors. All rights reserved.          Follow up with PCP in 3-5 days.  Proceed to  ER if symptoms worsen.    Chief Complaint     Chief Complaint   Patient presents with    abcess     Left n=middle finger started on Monday . No discharge reported . Pain is radiating down finger to hand.          History of Present Illness       Patient is a 54 year old female with PMH significant for HTN, RENETTA, who presents for evaluation of redness, swelling and pain around left index fingernail since Monday. She denies trauma/injury, drainage, numbness/tingling, fever, body aches or chills.         Review of Systems   Review of Systems   Constitutional:  Negative for fatigue and fever.   HENT:  Negative for congestion, ear discharge, ear pain, postnasal drip, rhinorrhea, sinus pressure, sinus pain, sneezing and sore throat.    Eyes:  Negative.  Negative for pain, discharge, redness and itching.   Respiratory: Negative.  Negative for apnea, cough, choking, chest tightness, shortness of breath and stridor.    Cardiovascular: Negative.  Negative for chest pain and palpitations.   Gastrointestinal: Negative.  Negative for diarrhea, nausea and vomiting.   Endocrine: Negative.  Negative for polydipsia, polyphagia and polyuria.   Genitourinary: Negative.  Negative for decreased urine volume and flank pain.   Musculoskeletal: Negative.  Negative for arthralgias, back pain, myalgias, neck pain and neck stiffness.   Skin:  Positive for color change. Negative for pallor, rash and wound.   Allergic/Immunologic: Negative.  Negative for environmental allergies.   Neurological: Negative.  Negative for dizziness, facial asymmetry, light-headedness, numbness and headaches.   Hematological: Negative.  Negative for adenopathy.   Psychiatric/Behavioral: Negative.           Current Medications       Current Outpatient Medications:     mometasone (ELOCON) 0.1 % ointment, Apply topically daily, Disp: 45 g, Rfl: 1    naproxen (Naprosyn) 500 mg tablet, Take 1 tablet (500 mg total) by mouth 2 (two) times a day with meals for 7 days, Disp: 14 tablet, Rfl: 0    sulfamethoxazole-trimethoprim (BACTRIM DS) 800-160 mg per tablet, Take 1 tablet by mouth every 12 (twelve) hours for 7 days, Disp: 14 tablet, Rfl: 0    valsartan (DIOVAN) 320 MG tablet, Take 1 tablet (320 mg total) by mouth daily, Disp: 90 tablet, Rfl: 3    Current Allergies     Allergies as of 10/04/2024 - Reviewed 10/04/2024   Allergen Reaction Noted    Medrol [methylprednisolone] Rash 02/25/2014    Amoxicillin Rash 02/25/2014    Penicillins Rash 02/25/2014    Prednisone Rash 08/07/2024    Prevpac [xuxzjvpto-yhrscdazt-bbjarmlfj] Rash 02/25/2014            The following portions of the patient's history were reviewed and updated as appropriate: allergies, current medications, past family history, past medical  history, past social history, past surgical history and problem list.     Past Medical History:   Diagnosis Date    Acute diffuse otitis externa of left ear 10/24/2019    Anemia     Atopic dermatitis 08/06/2015    Atypical chest pain 04/02/2015    BPPV (benign paroxysmal positional vertigo) 01/05/2017    Chest pain     Class 1 obesity with alveolar hypoventilation without serious comorbidity with body mass index (BMI) of 32.0 to 32.9 in adult (HCC) 07/20/2017    COVID-19 virus infection 10/09/2023    Elevated liver function tests 04/26/2018    Gastroenteritis     Hypertension     Iron deficiency anemia 03/30/2017    Lateral epicondylitis of right elbow 01/11/2018    PONV (postoperative nausea and vomiting)        Past Surgical History:   Procedure Laterality Date    BREAST LUMPECTOMY Left 8/15/2024    Procedure: BREAST BRACKETED LUMPECTOMY WITH JOHAN  GUIDANCE AND NEEDLE LOCALIZATION (NEEDLE LOC IN MAMMO AT 9AM);  Surgeon: Alcon López MD;  Location: [facility];  Service: Surgical Oncology    FOOT SURGERY Left     MAMMO NEEDLE LOCALIZATION LEFT (ALL INC) Left 08/02/2024    MAMMO NEEDLE LOCALIZATION LEFT (ALL INC) Left 8/15/2024    MAMMO STEREOTACTIC BREAST BIOPSY LEFT (ALL INC) Left 06/18/2024       Family History   Problem Relation Age of Onset    Diabetes Mother         Diabetes Mellitus    No Known Problems Father     No Known Problems Sister     No Known Problems Sister     No Known Problems Daughter     No Known Problems Maternal Aunt     No Known Problems Maternal Aunt     No Known Problems Maternal Aunt     No Known Problems Maternal Aunt     No Known Problems Maternal Aunt     No Known Problems Maternal Aunt     No Known Problems Maternal Aunt     No Known Problems Maternal Aunt     No Known Problems Maternal Aunt     No Known Problems Maternal Grandmother     No Known Problems Maternal Grandfather     No Known Problems Paternal Grandmother     No Known Problems Paternal Grandfather     Diabetes Other          Diabetes Mellitus         Medications have been verified.        Objective   /75   Pulse 81   Temp 97.8 °F (36.6 °C) (Tympanic)   Resp 18   LMP 09/30/2024   SpO2 96%        Physical Exam     Physical Exam  Vitals and nursing note reviewed.   Constitutional:       General: She is not in acute distress.     Appearance: Normal appearance. She is not ill-appearing, toxic-appearing or diaphoretic.   HENT:      Head: Normocephalic and atraumatic.      Right Ear: External ear normal.      Left Ear: External ear normal.      Nose: Nose normal. No congestion or rhinorrhea.      Mouth/Throat:      Mouth: Mucous membranes are moist.   Eyes:      Extraocular Movements: Extraocular movements intact.      Conjunctiva/sclera: Conjunctivae normal.      Pupils: Pupils are equal, round, and reactive to light.   Cardiovascular:      Rate and Rhythm: Normal rate and regular rhythm.      Pulses: Normal pulses.      Heart sounds: Normal heart sounds. No murmur heard.     No friction rub. No gallop.   Pulmonary:      Effort: Pulmonary effort is normal. No respiratory distress.      Breath sounds: Normal breath sounds. No stridor. No wheezing, rhonchi or rales.   Abdominal:      General: Bowel sounds are normal.      Palpations: Abdomen is soft.      Tenderness: There is no abdominal tenderness. There is no guarding or rebound.   Musculoskeletal:         General: Normal range of motion.      Cervical back: Normal range of motion and neck supple. No tenderness.   Skin:     General: Skin is warm and dry.      Capillary Refill: Capillary refill takes less than 2 seconds.      Findings: Erythema present.             Comments: (+) Fluctuant paronychia or medial aspect of left index fingernail, purulent discharge visible under surface, no active draining.    Neurological:      General: No focal deficit present.      Mental Status: She is alert and oriented to person, place, and time.      Cranial Nerves: No cranial nerve deficit.    Psychiatric:         Mood and Affect: Mood normal.         Behavior: Behavior normal.     Incision and drain    Date/Time: 10/4/2024 6:00 PM    Performed by: COLLEEN Salter  Authorized by: COLLEEN Salter  Universal Protocol:  procedure performed by consultantConsent: Verbal consent obtained.  Risks and benefits: risks, benefits and alternatives were discussed  Consent given by: patient  Patient understanding: patient states understanding of the procedure being performed  Patient identity confirmed: verbally with patient and provided demographic data    Location:     Type:  Abscess (Paronychia)    Location:  Upper extremity    Upper extremity location:  L index finger  Pre-procedure details:     Procedure prep: alcohol prep pad.  Anesthesia (see MAR for exact dosages):     Anesthesia method:  None  Procedure details:     Complexity:  Simple    Needle aspiration: no      Incision types:  Stab incision    Scalpel blade:  11    Approach:  Open    Incision depth:  Superficial    Drainage:  Purulent    Drainage amount:  Moderate    Wound treatment:  Wound left open    Packing materials:  None  Post-procedure details:     Patient tolerance of procedure:  Tolerated well, no immediate complications

## 2024-10-18 ENCOUNTER — CONSULT (OUTPATIENT)
Dept: HEMATOLOGY ONCOLOGY | Facility: CLINIC | Age: 54
End: 2024-10-18
Payer: COMMERCIAL

## 2024-10-18 VITALS
HEART RATE: 77 BPM | WEIGHT: 188.6 LBS | HEIGHT: 63 IN | DIASTOLIC BLOOD PRESSURE: 90 MMHG | RESPIRATION RATE: 16 BRPM | SYSTOLIC BLOOD PRESSURE: 134 MMHG | OXYGEN SATURATION: 98 % | BODY MASS INDEX: 33.42 KG/M2 | TEMPERATURE: 97 F

## 2024-10-18 DIAGNOSIS — D05.02 LOBULAR CARCINOMA IN SITU (LCIS) OF LEFT BREAST: Primary | ICD-10-CM

## 2024-10-18 PROCEDURE — 99203 OFFICE O/P NEW LOW 30 MIN: CPT | Performed by: INTERNAL MEDICINE

## 2024-10-21 NOTE — PROGRESS NOTES
Oncology Outpatient Consult Note  April Casey 54 y.o. female MRN: @ Encounter: [id number]        Date:  10/21/2024        CC:  LCIS      HPI:  April Casey is seen for initial consultation 10/21/2024 regarding her history of lobular carcinoma in situ.  She had an excisional biopsy that did not find any invasive or noninvasive cancer.  She will be transitioning her care from Dr. López to Dr. Cardarelli.  Recommendation has been made for mammogram and MRI screening.  Her only other medical history is hypertension.  Her last menstrual period was September 30.  Her periods have still been regular but heavy.  She has no history of DVT or PE.  She is starting to have some hot flashes.  She is in her normal state of health today otherwise.  She has no family history of cancer and has never had genetic testing.  She works as a phlebotomist.  She denies any tobacco or alcohol.  She has no dental problems.        ROS: As stated in history of present illness otherwise her 14 point review of systems today was negative.    ECOG PS:0    Oncology History   Lobular carcinoma in situ (LCIS) of left breast   6/18/2024 Biopsy    Left breast stereotactic biopsy  1 o'clock, anterior portion (tri-bell)  Atypical ductal hyperplasia  Background breast parenchyma with fibrocystic change including microcysts, usual ductal hyperplasia, columnar cell change, and stromal fibrosis    Concordant. High risk lesion appears unifocal; calcifications cover an area of 3.7 cm. US left axilla not performed. Right breast clear on 4/2024 mammo.     8/15/2024 Surgery    Left breast bracketed lumpectomy with JOHAN  guidance and needle localization  Lobular carcinoma in situ, classic type, scattered foci in a background of extensive atypical lobular hyperplasia  ADH  FEA, columnar cell change/hyperplasia, sclerosing adenosis, apocrine metaplasia, and a radial scar  Biopsy site changes present             Test Results:    Imaging: No results  "found.    Labs:   Lab Results   Component Value Date    WBC 9.30 07/17/2024    HGB 12.3 07/17/2024    HCT 38.7 07/17/2024    MCV 89 07/17/2024     07/17/2024     Lab Results   Component Value Date    K 4.1 07/17/2024     07/17/2024    CO2 27 07/17/2024    BUN 14 07/17/2024    CREATININE 0.66 07/17/2024    GLUF 107 (H) 07/17/2024    CALCIUM 9.0 07/17/2024    AST 31 07/17/2024    ALT 47 07/17/2024    ALKPHOS 68 07/17/2024    EGFR 100 07/17/2024         No results found for: \"SPEP\", \"UPEP\"    No results found for: \"PSA\"    No results found for: \"CEA\"    No results found for: \"AFP\"    Lab Results   Component Value Date    IRON 42 (L) 03/09/2024    TIBC 500 (H) 04/19/2019    FERRITIN <4 (L) 07/15/2022       No results found for: \"JEISUBQU85\"            Active Problems:   Patient Active Problem List   Diagnosis    Sleep disorder    Iron deficiency anemia    Class 1 obesity with alveolar hypoventilation without serious comorbidity with body mass index (BMI) of 32.0 to 32.9 in adult (HCC)    Bilateral carpal tunnel syndrome    Benign essential hypertension    Back pain, lumbosacral    Leg paresthesia    Onychomycosis of left great toe    Vision disturbance    Atopic neurodermatitis    Pes anserine bursitis    Lymphadenopathy of right cervical region    Plantar fasciitis of left foot    Tension headache    Localized swelling of finger of left hand    Infection of thumb    Acute viral syndrome    Right foot pain    PONV (postoperative nausea and vomiting)    Lobular carcinoma in situ (LCIS) of left breast    Pain of left calf       Past Medical History:   Past Medical History:   Diagnosis Date    Acute diffuse otitis externa of left ear 10/24/2019    Anemia     Atopic dermatitis 08/06/2015    Atypical chest pain 04/02/2015    BPPV (benign paroxysmal positional vertigo) 01/05/2017    Chest pain     Class 1 obesity with alveolar hypoventilation without serious comorbidity with body mass index (BMI) of 32.0 to 32.9 " in adult (HCC) 07/20/2017    COVID-19 virus infection 10/09/2023    Elevated liver function tests 04/26/2018    Gastroenteritis     Hypertension     Iron deficiency anemia 03/30/2017    Lateral epicondylitis of right elbow 01/11/2018    PONV (postoperative nausea and vomiting)        Surgical History:   Past Surgical History:   Procedure Laterality Date    BREAST LUMPECTOMY Left 8/15/2024    Procedure: BREAST BRACKETED LUMPECTOMY WITH JOHAN  GUIDANCE AND NEEDLE LOCALIZATION (NEEDLE LOC IN MAMMO AT 9AM);  Surgeon: Alcon López MD;  Location: [facility];  Service: Surgical Oncology    FOOT SURGERY Left     MAMMO NEEDLE LOCALIZATION LEFT (ALL INC) Left 08/02/2024    MAMMO NEEDLE LOCALIZATION LEFT (ALL INC) Left 8/15/2024    MAMMO STEREOTACTIC BREAST BIOPSY LEFT (ALL INC) Left 06/18/2024       Family History:    Family History   Problem Relation Age of Onset    Diabetes Mother         Diabetes Mellitus    No Known Problems Father     No Known Problems Sister     No Known Problems Sister     No Known Problems Daughter     No Known Problems Maternal Aunt     No Known Problems Maternal Aunt     No Known Problems Maternal Aunt     No Known Problems Maternal Aunt     No Known Problems Maternal Aunt     No Known Problems Maternal Aunt     No Known Problems Maternal Aunt     No Known Problems Maternal Aunt     No Known Problems Maternal Aunt     No Known Problems Maternal Grandmother     No Known Problems Maternal Grandfather     No Known Problems Paternal Grandmother     No Known Problems Paternal Grandfather     Diabetes Other         Diabetes Mellitus       Cancer-related family history is not on file.    Social History:   Social History     Socioeconomic History    Marital status: /Civil Union     Spouse name: Not on file    Number of children: Not on file    Years of education: Not on file    Highest education level: Not on file   Occupational History    Not on file   Tobacco Use    Smoking status: Never     Smokeless tobacco: Never   Vaping Use    Vaping status: Never Used   Substance and Sexual Activity    Alcohol use: No    Drug use: No    Sexual activity: Not Currently     Partners: Male   Other Topics Concern    Not on file   Social History Narrative    Advance directive declined by patient     Social Determinants of Health     Financial Resource Strain: Not on file   Food Insecurity: Not on file   Transportation Needs: Not on file   Physical Activity: Not on file   Stress: Not on file   Social Connections: Not on file   Intimate Partner Violence: Not on file   Housing Stability: Not on file       Current Medications:   Current Outpatient Medications   Medication Sig Dispense Refill    mometasone (ELOCON) 0.1 % ointment Apply topically daily 45 g 1    naproxen (Naprosyn) 500 mg tablet Take 1 tablet (500 mg total) by mouth 2 (two) times a day with meals for 7 days 14 tablet 0    valsartan (DIOVAN) 320 MG tablet Take 1 tablet (320 mg total) by mouth daily 90 tablet 3     No current facility-administered medications for this visit.       Allergies:   Allergies   Allergen Reactions    Medrol [Methylprednisolone] Rash    Amoxicillin Rash    Bactrim [Sulfamethoxazole-Trimethoprim] Rash    Penicillins Rash    Prednisone Rash    Prevpac [[place]ue-Oqwrxrcug-Hbpkgjkhf] Rash         Physical Exam:    Body surface area is 1.89 meters squared.    Wt Readings from Last 3 Encounters:   10/18/24 85.5 kg (188 lb 9.6 oz)   09/17/24 87.5 kg (193 lb)   08/28/24 88.2 kg (194 lb 8 oz)        Temp Readings from Last 3 Encounters:   10/18/24 (!) 97 °F (36.1 °C) (Temporal)   10/04/24 97.8 °F (36.6 °C) (Tympanic)   09/17/24 97.5 °F (36.4 °C) (Temporal)        BP Readings from Last 3 Encounters:   10/18/24 134/90   10/04/24 136/75   09/17/24 138/88         Pulse Readings from Last 3 Encounters:   10/18/24 77   10/04/24 81   09/17/24 76     @LASTSAO2(3)@    Physical Exam     Constitutional   General appearance: No acute distress, well  appearing and well nourished.    Eyes   Conjunctiva and lids: No swelling, erythema or discharge.    Pupils and irises: Equal, round and reactive to light.    Ears, Nose, Mouth, and Throat   External inspection of ears and nose: Normal.    Nasal mucosa, septum, and turbinates: Normal without edema or erythema.    Oropharynx: Normal with no erythema, edema, exudate or lesions.    Pulmonary   Respiratory effort: No increased work of breathing or signs of respiratory distress.    Auscultation of lungs: Clear to auscultation.    Cardiovascular   Palpation of heart: Normal PMI, no thrills.    Auscultation of heart: Normal rate and rhythm, normal S1 and S2, without murmurs.    Examination of extremities for edema and/or varicosities: Normal.    Carotid pulses: Normal.    Abdomen   Abdomen: Non-tender, no masses.    Liver and spleen: No hepatomegaly or splenomegaly.    Lymphatic   Palpation of lymph nodes in neck: No lymphadenopathy.    Musculoskeletal   Gait and station: Normal.    Digits and nails: Normal without clubbing or cyanosis.    Inspection/palpation of joints, bones, and muscles: Normal.    Skin   Skin and subcutaneous tissue: Normal without rashes or lesions.    Neurologic   Cranial nerves: Cranial nerves 2-12 intact.    Sensation: No sensory loss.    Psychiatric   Orientation to person, place, and time: Normal.    Mood and affect: Normal.          Assessment/ Plan: 54-year-old female with lobular carcinoma in situ.  We discussed that this does not represent a cancer but rather a risk factor for cancer.  Risk is about 1 %/year.  We would need to use tamoxifen for risk reduction because she is still premenopausal.  If at some point she would become postmenopausal we can switch an aromatase inhibitor.  She understands the tamoxifen will cut this risk by about half with no survival benefit.  We reviewed the side effects of tamoxifen which include hot flashes, 1% risk of uterine cancer over 5 years, a small risk of  blood clot and stroke, weight gain, sexual dysfunction, difficulty sleeping, worsening of mood, and elevated liver function tests.  I think there would be a concern it could potentially make her periods heavier and they are already quite bothersome to her.  After a discussion of the risks and benefits of chemoprevention with tamoxifen the patient has decided against taking it.  She plans on being faithful about her breast screening which I assured her is the most important part of the equation.  If in the future she would become menopausal and would like to reconsider chemoprevention I am certainly here to discuss.  I will see her going forward on an as-needed basis.          I spent 30 minutes in chart review, face to face counseling, coordination of care, and documentation.

## 2024-11-22 ENCOUNTER — HOSPITAL ENCOUNTER (OUTPATIENT)
Dept: VASCULAR ULTRASOUND | Facility: HOSPITAL | Age: 54
Discharge: HOME | End: 2024-11-22
Attending: INTERNAL MEDICINE
Payer: COMMERCIAL

## 2024-11-22 DIAGNOSIS — M79.662 PAIN OF LEFT CALF: ICD-10-CM

## 2024-11-22 PROCEDURE — 93971 EXTREMITY STUDY: CPT

## 2024-11-22 PROCEDURE — 93971 EXTREMITY STUDY: CPT | Performed by: SURGERY

## 2024-12-18 ENCOUNTER — VBI (OUTPATIENT)
Dept: ADMINISTRATIVE | Facility: OTHER | Age: 54
End: 2024-12-18

## 2024-12-18 NOTE — TELEPHONE ENCOUNTER
12/18/24 8:31 AM     Chart reviewed for   Cervical Cancer Screening    ; nothing is submitted to the patient's insurance at this time.     RODOLFO OLIVA MA   [facility]

## 2025-02-07 PROBLEM — Z91.89 AT HIGH RISK FOR BREAST CANCER: Status: ACTIVE | Noted: 2025-02-07

## 2025-02-28 ENCOUNTER — OFFICE VISIT (OUTPATIENT)
Dept: SURGICAL ONCOLOGY | Facility: CLINIC | Age: 55
End: 2025-02-28
Payer: COMMERCIAL

## 2025-02-28 VITALS
DIASTOLIC BLOOD PRESSURE: 86 MMHG | BODY MASS INDEX: 33.31 KG/M2 | HEART RATE: 92 BPM | HEIGHT: 63 IN | SYSTOLIC BLOOD PRESSURE: 152 MMHG | TEMPERATURE: 96.2 F | WEIGHT: 188 LBS | OXYGEN SATURATION: 98 %

## 2025-02-28 DIAGNOSIS — Z91.89 INCREASED RISK OF BREAST CANCER: ICD-10-CM

## 2025-02-28 DIAGNOSIS — R92.30 DENSE BREAST TISSUE: ICD-10-CM

## 2025-02-28 DIAGNOSIS — Z12.39 BREAST CANCER SCREENING OTHER THAN MAMMOGRAM: ICD-10-CM

## 2025-02-28 DIAGNOSIS — D05.02 LOBULAR CARCINOMA IN SITU (LCIS) OF LEFT BREAST: Primary | ICD-10-CM

## 2025-02-28 PROCEDURE — 99213 OFFICE O/P EST LOW 20 MIN: CPT

## 2025-02-28 NOTE — PROGRESS NOTES
Name: April Casey      : 1970      MRN: [id number]  Encounter Provider: COLLEEN Trejo  Encounter Date: 2025   Encounter department: [facility]  :  Assessment & Plan  Lobular carcinoma in situ (LCIS) of left breast    Orders:  •  MRI breast bilateral w and wo contrast w cad; Future    Increased risk of breast cancer  The patient continues to do well after surgery, and has no new symptoms.  She will proceed with mammogram in , and we will plan to start annual screening MRI in December. We did discuss potentially switching to contrast-enhanced screening mammograms next year when they become readily available.  I will plan to see her again in 1 year, or sooner should the need arise.  Orders:  •  MRI breast bilateral w and wo contrast w cad; Future    Breast cancer screening other than mammogram    Orders:  •  MRI breast bilateral w and wo contrast w cad; Future  •  BUN; Future  •  Creatinine, serum; Future    Dense breast tissue    Orders:  •  MRI breast bilateral w and wo contrast w cad; Future        History of Present Illness   April Casey is a 54 y.o. year old female who presents today for high risk breast surveillance.  She is 6 months s/p excision of a high risk left breast lesion. She reports she has healed well, and her only complaint is some residual pain in the breast.  She has not noticed any new breast lumps, skin changes or adenopathy.  She does carry up to a 60% risk for breast cancer in the future, and also has dense breast tissue.  I have reviewed breast MRI as an additional screening modality, as well as the potential for contrast-enhanced mammography in the future.  She is already scheduled for screening mammogram in .      Oncology History   Cancer Staging   No matching staging information was found for the patient.  Oncology History   Lobular carcinoma in situ (LCIS) of left breast   2024 Biopsy    Left breast  "stereotactic biopsy  1 o'clock, anterior portion (tri-bell)  Atypical ductal hyperplasia  Background breast parenchyma with fibrocystic change including microcysts, usual ductal hyperplasia, columnar cell change, and stromal fibrosis    Concordant. High risk lesion appears unifocal; calcifications cover an area of 3.7 cm. US left axilla not performed. Right breast clear on 4/2024 mammo.     8/15/2024 Surgery    Left breast bracketed lumpectomy with JOHAN  guidance and needle localization  Lobular carcinoma in situ, classic type, scattered foci in a background of extensive atypical lobular hyperplasia  ADH  FEA, columnar cell change/hyperplasia, sclerosing adenosis, apocrine metaplasia, and a radial scar  Biopsy site changes present        Review of Systems A complete review of systems is negative other than that noted above in the HPI.         Objective   /86   Pulse 92   Temp (!) 96.2 °F (35.7 °C)   Ht 5' 3\" (1.6 m)   Wt 85.3 kg (188 lb)   SpO2 98%   BMI 33.30 kg/m²     Pain Screening:  Pain Score:   5    Physical Exam  Vitals reviewed.   Constitutional:       General: She is not in acute distress.     Appearance: Normal appearance. She is not ill-appearing or toxic-appearing.   HENT:      Head: Normocephalic and atraumatic.   Eyes:      General: No scleral icterus.  Cardiovascular:      Rate and Rhythm: Normal rate.   Pulmonary:      Effort: Pulmonary effort is normal.   Chest:   Breasts:     Right: Normal.      Comments: Breasts are generally smooth and symmetric bilaterally, with mild scarring in the left breast. There are no dominant masses, nodules, skin changes or tenderness on exam. I do not appreciate any adenopathy.  Musculoskeletal:         General: No swelling or tenderness. Normal range of motion.      Cervical back: Normal range of motion and neck supple.   Lymphadenopathy:      Cervical: No cervical adenopathy.      Upper Body:      Right upper body: No supraclavicular, axillary or " pectoral adenopathy.      Left upper body: No supraclavicular, axillary or pectoral adenopathy.   Skin:     General: Skin is warm and dry.      Coloration: Skin is not jaundiced.      Findings: No erythema.   Neurological:      General: No focal deficit present.      Mental Status: She is alert and oriented to person, place, and time.   Psychiatric:         Mood and Affect: Mood normal.         Behavior: Behavior normal.         Thought Content: Thought content normal.         Judgment: Judgment normal.

## 2025-02-28 NOTE — LETTER
February 28, 2025     Patient: April Casey  YOB: 1970  Date of Visit: 2/28/2025      To Whom it May Concern:    April Casey is under my professional care. April was seen in my office on 2/28/2025. Please excuse her absence today for this medically necessary visit.    If you have any questions or concerns, please don't hesitate to call.         Sincerely,            COLLEEN Trejo

## 2025-02-28 NOTE — ASSESSMENT & PLAN NOTE
The patient continues to do well after surgery, and has no new symptoms.  She will proceed with mammogram in June, and we will plan to start annual screening MRI in December. We did discuss potentially switching to contrast-enhanced screening mammograms next year when they become readily available.  I will plan to see her again in 1 year, or sooner should the need arise.  Orders:  •  MRI breast bilateral w and wo contrast w cad; Future

## 2025-03-18 ENCOUNTER — OFFICE VISIT (OUTPATIENT)
Age: 55
End: 2025-03-18
Payer: COMMERCIAL

## 2025-03-18 VITALS
DIASTOLIC BLOOD PRESSURE: 76 MMHG | SYSTOLIC BLOOD PRESSURE: 138 MMHG | HEIGHT: 63 IN | BODY MASS INDEX: 32.78 KG/M2 | HEART RATE: 78 BPM | WEIGHT: 185 LBS | TEMPERATURE: 97.7 F | OXYGEN SATURATION: 98 %

## 2025-03-18 DIAGNOSIS — R07.1 CHEST PAIN ON BREATHING: ICD-10-CM

## 2025-03-18 DIAGNOSIS — I10 BENIGN ESSENTIAL HYPERTENSION: ICD-10-CM

## 2025-03-18 DIAGNOSIS — G56.03 BILATERAL CARPAL TUNNEL SYNDROME: ICD-10-CM

## 2025-03-18 DIAGNOSIS — R73.03 PREDIABETES: ICD-10-CM

## 2025-03-18 DIAGNOSIS — E01.0 THYROMEGALY: ICD-10-CM

## 2025-03-18 DIAGNOSIS — Z12.11 ENCOUNTER FOR SCREENING FOR MALIGNANT NEOPLASM OF COLON: Primary | ICD-10-CM

## 2025-03-18 PROCEDURE — 99214 OFFICE O/P EST MOD 30 MIN: CPT | Performed by: INTERNAL MEDICINE

## 2025-03-18 RX ORDER — VALSARTAN 320 MG/1
320 TABLET ORAL DAILY
Qty: 100 TABLET | Refills: 3 | Status: SHIPPED | OUTPATIENT
Start: 2025-03-18

## 2025-03-18 NOTE — ASSESSMENT & PLAN NOTE
Blood pressure is borderline controlled on current medications.  Will continue to monitor for medication adjustments  Orders:    valsartan (DIOVAN) 320 MG tablet; Take 1 tablet (320 mg total) by mouth daily    CBC and differential    Comprehensive metabolic panel

## 2025-03-18 NOTE — PROGRESS NOTES
Name: April Casey      : 1970      MRN: [id number]  Encounter Provider: Ortega Delgado MD  Encounter Date: 3/18/2025   Encounter department: [facility]  :  Assessment & Plan  Encounter for screening for malignant neoplasm of colon  Patient will schedule a colonoscopy  Orders:    Ambulatory Referral to Gastroenterology; Future    Benign essential hypertension  Blood pressure is borderline controlled on current medications.  Will continue to monitor for medication adjustments  Orders:    valsartan (DIOVAN) 320 MG tablet; Take 1 tablet (320 mg total) by mouth daily    CBC and differential    Comprehensive metabolic panel    Bilateral carpal tunnel syndrome  Left wrist splint  Orders:    CBC and differential    Comprehensive metabolic panel    Thyromegaly  Thyroid ultrasound  Orders:    US thyroid; Future    T4, free; Future    TSH, 3rd generation; Future    Prediabetes  Will continue to monitor for any metabolic changes  Orders:    Comprehensive metabolic panel    Lipid panel; Future    Hemoglobin A1C; Future    Chest pain on breathing  Suspect some form of mild pleuritis.  Will check a C-reactive protein in addition to routine labs  Orders:    XR chest pa and lateral; Future    C-reactive protein           History of Present Illness   The patient presents to the office for a 6-month follow-up visit for hypertension she is previously noted to have some carpal tunnel syndrome and she is still complaining of some symptoms consistent with carpal tunnel especially the left hand.  She is only complaining of some pain in the fourth finger of her right hand but states that the pain has been present every day for the last several months.  She takes an aspirin 81 mg on the days that she feels the pain but it does not seem to help very much.  She has not had any recent lab studies.      Review of Systems   Constitutional: Negative.    HENT: Negative.     Eyes: Negative.   "  Respiratory: Negative.     Cardiovascular:  Positive for chest pain (Occasional left-sided chest rib pain). Negative for palpitations and leg swelling.   Gastrointestinal: Negative.    Endocrine: Negative.    Genitourinary: Negative.    Musculoskeletal:  Positive for arthralgias (Left hand and wrist).   Skin: Negative.    Allergic/Immunologic: Negative.    Neurological: Negative.    Hematological: Negative.    Psychiatric/Behavioral: Negative.         Objective   /76 (BP Location: Left arm, Patient Position: Sitting, Cuff Size: Large)   Pulse 78   Temp 97.7 °F (36.5 °C) (Temporal)   Ht 5' 3\" (1.6 m)   Wt 83.9 kg (185 lb)   SpO2 98%   BMI 32.77 kg/m²      Physical Exam  Vitals reviewed.   Constitutional:       General: She is not in acute distress.     Appearance: Normal appearance. She is not ill-appearing, toxic-appearing or diaphoretic.   HENT:      Head: Normocephalic and atraumatic.      Right Ear: External ear normal.      Left Ear: External ear normal.      Nose: Nose normal.      Mouth/Throat:      Mouth: Mucous membranes are moist.   Eyes:      General: No scleral icterus.     Conjunctiva/sclera: Conjunctivae normal.      Pupils: Pupils are equal, round, and reactive to light.   Neck:      Thyroid: Thyromegaly (Prominence of the right thyroid lobe?) present.   Cardiovascular:      Rate and Rhythm: Normal rate and regular rhythm.      Heart sounds: Normal heart sounds. No murmur heard.  Pulmonary:      Effort: Pulmonary effort is normal. No respiratory distress.      Breath sounds: Normal breath sounds.   Chest:      Chest wall: No tenderness.   Abdominal:      General: Abdomen is flat. Bowel sounds are normal. There is no distension.   Musculoskeletal:         General: Tenderness (Right fourth finger, left wrist and second third and fourth fingers) present. No swelling or deformity. Normal range of motion.      Cervical back: Neck supple. No rigidity or tenderness.      Right lower leg: No " edema.   Lymphadenopathy:      Cervical: No cervical adenopathy.   Skin:     General: Skin is warm and dry.      Capillary Refill: Capillary refill takes less than 2 seconds.      Coloration: Skin is not jaundiced.      Findings: No bruising, erythema or rash.   Neurological:      General: No focal deficit present.      Mental Status: She is alert and oriented to person, place, and time. Mental status is at baseline.      Motor: No weakness.      Coordination: Coordination normal.   Psychiatric:         Mood and Affect: Mood normal.         Behavior: Behavior normal.         Thought Content: Thought content normal.         Judgment: Judgment normal.

## 2025-05-01 ENCOUNTER — APPOINTMENT (OUTPATIENT)
Dept: RADIOLOGY | Age: 55
End: 2025-05-01
Payer: COMMERCIAL

## 2025-05-01 DIAGNOSIS — R07.1 CHEST PAIN ON BREATHING: ICD-10-CM

## 2025-05-01 PROCEDURE — 71046 X-RAY EXAM CHEST 2 VIEWS: CPT

## 2025-05-15 ENCOUNTER — VBI (OUTPATIENT)
Dept: ADMINISTRATIVE | Facility: OTHER | Age: 55
End: 2025-05-15

## 2025-05-15 NOTE — TELEPHONE ENCOUNTER
05/15/25 9:38 AM     Chart reviewed for   Cervical Cancer Screening    ; nothing is submitted to the patient's insurance at this time.     RODOLFO OLIVA MA   [facility]

## 2025-06-27 ENCOUNTER — HOSPITAL ENCOUNTER (OUTPATIENT)
Dept: RADIOLOGY | Age: 55
Discharge: HOME | End: 2025-06-27
Attending: INTERNAL MEDICINE
Payer: COMMERCIAL

## 2025-06-27 ENCOUNTER — HOSPITAL ENCOUNTER (OUTPATIENT)
Dept: RADIOLOGY | Age: 55
Discharge: HOME | End: 2025-06-27
Payer: COMMERCIAL

## 2025-06-27 VITALS — HEIGHT: 63 IN | BODY MASS INDEX: 32.78 KG/M2 | WEIGHT: 185 LBS

## 2025-06-27 DIAGNOSIS — E01.0 THYROMEGALY: ICD-10-CM

## 2025-06-27 DIAGNOSIS — Z12.31 ENCOUNTER FOR SCREENING MAMMOGRAM FOR MALIGNANT NEOPLASM OF BREAST: ICD-10-CM

## 2025-06-27 DIAGNOSIS — I10 BENIGN ESSENTIAL HYPERTENSION: ICD-10-CM

## 2025-06-27 PROCEDURE — 77067 SCR MAMMO BI INCL CAD: CPT

## 2025-06-27 PROCEDURE — 76536 US EXAM OF HEAD AND NECK: CPT

## 2025-06-27 PROCEDURE — 77063 BREAST TOMOSYNTHESIS BI: CPT

## (undated) DEVICE — NEEDLE 25G X 1 1/2

## (undated) DEVICE — PROVE COVER: Brand: UNBRANDED

## (undated) DEVICE — BETHLEHEM UNIVERSAL MINOR GEN: Brand: CARDINAL HEALTH

## (undated) DEVICE — DECANTER: Brand: UNBRANDED

## (undated) DEVICE — PENCIL ELECTROSURG E-Z CLEAN -0035H

## (undated) DEVICE — CHLORAPREP HI-LITE 26ML ORANGE

## (undated) DEVICE — TUBING SUCTION 5MM X 12 FT

## (undated) DEVICE — EXOFIN PRECISION PEN HIGH VISCOSITY TOPICAL SKIN ADHESIVE: Brand: EXOFIN PRECISION PEN, 1G

## (undated) DEVICE — SUT MONOCRYL PLUS 3-0 PS-2 27 IN MCP427H

## (undated) DEVICE — GLOVE SRG BIOGEL 7

## (undated) DEVICE — SUT PDS II 4-0 PS-2 18 IN Z496G

## (undated) DEVICE — MEDI-VAC YANK SUCT HNDL W/TPRD BULBOUS TIP: Brand: CARDINAL HEALTH

## (undated) DEVICE — LIGACLIP MCA MULTIPLE CLIP APPLIERS, 20 SMALL CLIPS: Brand: LIGACLIP

## (undated) DEVICE — SMOKE EVACUATION TUBING WITH 8 IN INTEGRAL WAND AND SPONGE GUARD: Brand: BUFFALO FILTER